# Patient Record
Sex: FEMALE | Race: WHITE | Employment: OTHER | URBAN - METROPOLITAN AREA
[De-identification: names, ages, dates, MRNs, and addresses within clinical notes are randomized per-mention and may not be internally consistent; named-entity substitution may affect disease eponyms.]

---

## 2020-07-29 ENCOUNTER — HOSPITAL ENCOUNTER (INPATIENT)
Age: 55
LOS: 4 days | Discharge: HOME | DRG: 854 | End: 2020-08-02
Attending: EMERGENCY MEDICINE | Admitting: INTERNAL MEDICINE
Payer: COMMERCIAL

## 2020-07-29 ENCOUNTER — APPOINTMENT (OUTPATIENT)
Dept: CT IMAGING | Age: 55
DRG: 854 | End: 2020-07-29
Payer: COMMERCIAL

## 2020-07-29 PROBLEM — N20.0 NEPHROLITHIASIS: Status: ACTIVE | Noted: 2020-07-29

## 2020-07-29 LAB
A/G RATIO: 1.2 (ref 1.1–2.2)
ALBUMIN SERPL-MCNC: 4.3 G/DL (ref 3.4–5)
ALP BLD-CCNC: 91 U/L (ref 40–129)
ALT SERPL-CCNC: 27 U/L (ref 10–40)
AMORPHOUS: ABNORMAL /HPF
ANION GAP SERPL CALCULATED.3IONS-SCNC: 12 MMOL/L (ref 3–16)
AST SERPL-CCNC: 25 U/L (ref 15–37)
BACTERIA: ABNORMAL /HPF
BASOPHILS ABSOLUTE: 0.1 K/UL (ref 0–0.2)
BASOPHILS RELATIVE PERCENT: 0.6 %
BILIRUB SERPL-MCNC: 0.4 MG/DL (ref 0–1)
BILIRUBIN URINE: ABNORMAL
BLOOD, URINE: ABNORMAL
BUN BLDV-MCNC: 21 MG/DL (ref 7–20)
CALCIUM SERPL-MCNC: 9.4 MG/DL (ref 8.3–10.6)
CHLORIDE BLD-SCNC: 100 MMOL/L (ref 99–110)
CLARITY: ABNORMAL
CO2: 30 MMOL/L (ref 21–32)
COLOR: YELLOW
CREAT SERPL-MCNC: 1 MG/DL (ref 0.6–1.1)
EOSINOPHILS ABSOLUTE: 0.1 K/UL (ref 0–0.6)
EOSINOPHILS RELATIVE PERCENT: 0.8 %
EPITHELIAL CELLS, UA: ABNORMAL /HPF (ref 0–5)
GFR AFRICAN AMERICAN: >60
GFR NON-AFRICAN AMERICAN: 57
GLOBULIN: 3.6 G/DL
GLUCOSE BLD-MCNC: 132 MG/DL (ref 70–99)
GLUCOSE URINE: NEGATIVE MG/DL
HCG QUALITATIVE: NEGATIVE
HCT VFR BLD CALC: 46 % (ref 36–48)
HEMOGLOBIN: 15 G/DL (ref 12–16)
KETONES, URINE: ABNORMAL MG/DL
LACTIC ACID: 3.6 MMOL/L (ref 0.4–2)
LEUKOCYTE ESTERASE, URINE: ABNORMAL
LIPASE: 28 U/L (ref 13–60)
LYMPHOCYTES ABSOLUTE: 1.2 K/UL (ref 1–5.1)
LYMPHOCYTES RELATIVE PERCENT: 9.3 %
MCH RBC QN AUTO: 28.8 PG (ref 26–34)
MCHC RBC AUTO-ENTMCNC: 32.6 G/DL (ref 31–36)
MCV RBC AUTO: 88.2 FL (ref 80–100)
MICROSCOPIC EXAMINATION: YES
MONOCYTES ABSOLUTE: 0.6 K/UL (ref 0–1.3)
MONOCYTES RELATIVE PERCENT: 4.9 %
NEUTROPHILS ABSOLUTE: 10.6 K/UL (ref 1.7–7.7)
NEUTROPHILS RELATIVE PERCENT: 84.4 %
NITRITE, URINE: NEGATIVE
PDW BLD-RTO: 14.5 % (ref 12.4–15.4)
PH UA: 5.5 (ref 5–8)
PLATELET # BLD: 179 K/UL (ref 135–450)
PMV BLD AUTO: 12 FL (ref 5–10.5)
POTASSIUM SERPL-SCNC: 3.9 MMOL/L (ref 3.5–5.1)
PROTEIN UA: 100 MG/DL
RBC # BLD: 5.22 M/UL (ref 4–5.2)
RBC UA: ABNORMAL /HPF (ref 0–4)
SODIUM BLD-SCNC: 142 MMOL/L (ref 136–145)
SPECIFIC GRAVITY UA: >=1.03 (ref 1–1.03)
SPECIMEN STATUS: NORMAL
TOTAL PROTEIN: 7.9 G/DL (ref 6.4–8.2)
URINE TYPE: ABNORMAL
UROBILINOGEN, URINE: 0.2 E.U./DL
WBC # BLD: 12.6 K/UL (ref 4–11)
WBC UA: ABNORMAL /HPF (ref 0–5)

## 2020-07-29 PROCEDURE — 96374 THER/PROPH/DIAG INJ IV PUSH: CPT

## 2020-07-29 PROCEDURE — 85025 COMPLETE CBC W/AUTO DIFF WBC: CPT

## 2020-07-29 PROCEDURE — 6360000002 HC RX W HCPCS: Performed by: EMERGENCY MEDICINE

## 2020-07-29 PROCEDURE — 2580000003 HC RX 258: Performed by: EMERGENCY MEDICINE

## 2020-07-29 PROCEDURE — 99285 EMERGENCY DEPT VISIT HI MDM: CPT

## 2020-07-29 PROCEDURE — 6370000000 HC RX 637 (ALT 250 FOR IP): Performed by: EMERGENCY MEDICINE

## 2020-07-29 PROCEDURE — 36415 COLL VENOUS BLD VENIPUNCTURE: CPT

## 2020-07-29 PROCEDURE — 87150 DNA/RNA AMPLIFIED PROBE: CPT

## 2020-07-29 PROCEDURE — 1200000000 HC SEMI PRIVATE

## 2020-07-29 PROCEDURE — 74177 CT ABD & PELVIS W/CONTRAST: CPT

## 2020-07-29 PROCEDURE — 87086 URINE CULTURE/COLONY COUNT: CPT

## 2020-07-29 PROCEDURE — 81001 URINALYSIS AUTO W/SCOPE: CPT

## 2020-07-29 PROCEDURE — 2580000003 HC RX 258

## 2020-07-29 PROCEDURE — 2580000003 HC RX 258: Performed by: NURSE PRACTITIONER

## 2020-07-29 PROCEDURE — 83605 ASSAY OF LACTIC ACID: CPT

## 2020-07-29 PROCEDURE — 84703 CHORIONIC GONADOTROPIN ASSAY: CPT

## 2020-07-29 PROCEDURE — 87077 CULTURE AEROBIC IDENTIFY: CPT

## 2020-07-29 PROCEDURE — 87040 BLOOD CULTURE FOR BACTERIA: CPT

## 2020-07-29 PROCEDURE — 80053 COMPREHEN METABOLIC PANEL: CPT

## 2020-07-29 PROCEDURE — 6360000004 HC RX CONTRAST MEDICATION: Performed by: EMERGENCY MEDICINE

## 2020-07-29 PROCEDURE — 96375 TX/PRO/DX INJ NEW DRUG ADDON: CPT

## 2020-07-29 PROCEDURE — 87186 SC STD MICRODIL/AGAR DIL: CPT

## 2020-07-29 PROCEDURE — 83690 ASSAY OF LIPASE: CPT

## 2020-07-29 RX ORDER — ALBUTEROL SULFATE 90 UG/1
2 AEROSOL, METERED RESPIRATORY (INHALATION) EVERY 4 HOURS PRN
COMMUNITY
Start: 2020-06-18

## 2020-07-29 RX ORDER — SODIUM CHLORIDE 9 MG/ML
INJECTION, SOLUTION INTRAVENOUS CONTINUOUS
Status: DISCONTINUED | OUTPATIENT
Start: 2020-07-29 | End: 2020-08-02 | Stop reason: HOSPADM

## 2020-07-29 RX ORDER — LEVOTHYROXINE SODIUM 0.15 MG/1
150 TABLET ORAL DAILY
Status: DISCONTINUED | OUTPATIENT
Start: 2020-07-30 | End: 2020-08-02 | Stop reason: HOSPADM

## 2020-07-29 RX ORDER — MORPHINE SULFATE 4 MG/ML
4 INJECTION, SOLUTION INTRAMUSCULAR; INTRAVENOUS
Status: DISCONTINUED | OUTPATIENT
Start: 2020-07-29 | End: 2020-07-30

## 2020-07-29 RX ORDER — SODIUM CHLORIDE 0.9 % (FLUSH) 0.9 %
10 SYRINGE (ML) INJECTION PRN
Status: DISCONTINUED | OUTPATIENT
Start: 2020-07-29 | End: 2020-08-02 | Stop reason: HOSPADM

## 2020-07-29 RX ORDER — LISINOPRIL AND HYDROCHLOROTHIAZIDE 12.5; 1 MG/1; MG/1
1 TABLET ORAL DAILY
Status: DISCONTINUED | OUTPATIENT
Start: 2020-07-30 | End: 2020-07-29 | Stop reason: CLARIF

## 2020-07-29 RX ORDER — ONDANSETRON 2 MG/ML
4 INJECTION INTRAMUSCULAR; INTRAVENOUS
Status: DISCONTINUED | OUTPATIENT
Start: 2020-07-29 | End: 2020-07-30

## 2020-07-29 RX ORDER — LEVOTHYROXINE SODIUM 0.15 MG/1
TABLET ORAL
COMMUNITY
Start: 2020-06-18

## 2020-07-29 RX ORDER — SODIUM CHLORIDE, SODIUM LACTATE, POTASSIUM CHLORIDE, AND CALCIUM CHLORIDE .6; .31; .03; .02 G/100ML; G/100ML; G/100ML; G/100ML
1000 INJECTION, SOLUTION INTRAVENOUS ONCE
Status: COMPLETED | OUTPATIENT
Start: 2020-07-29 | End: 2020-07-29

## 2020-07-29 RX ORDER — ONDANSETRON 2 MG/ML
4 INJECTION INTRAMUSCULAR; INTRAVENOUS EVERY 6 HOURS PRN
Status: DISCONTINUED | OUTPATIENT
Start: 2020-07-29 | End: 2020-08-02 | Stop reason: HOSPADM

## 2020-07-29 RX ORDER — ACETAMINOPHEN 500 MG
1000 TABLET ORAL ONCE
Status: COMPLETED | OUTPATIENT
Start: 2020-07-29 | End: 2020-07-29

## 2020-07-29 RX ORDER — SODIUM CHLORIDE, SODIUM LACTATE, POTASSIUM CHLORIDE, AND CALCIUM CHLORIDE .6; .31; .03; .02 G/100ML; G/100ML; G/100ML; G/100ML
1000 INJECTION, SOLUTION INTRAVENOUS ONCE
Status: COMPLETED | OUTPATIENT
Start: 2020-07-29 | End: 2020-07-30

## 2020-07-29 RX ORDER — SODIUM CHLORIDE 0.9 % (FLUSH) 0.9 %
10 SYRINGE (ML) INJECTION EVERY 12 HOURS SCHEDULED
Status: DISCONTINUED | OUTPATIENT
Start: 2020-07-29 | End: 2020-08-02 | Stop reason: HOSPADM

## 2020-07-29 RX ORDER — ACETAMINOPHEN 325 MG/1
650 TABLET ORAL EVERY 6 HOURS PRN
Status: DISCONTINUED | OUTPATIENT
Start: 2020-07-29 | End: 2020-08-02 | Stop reason: HOSPADM

## 2020-07-29 RX ORDER — HYDROCHLOROTHIAZIDE 25 MG/1
12.5 TABLET ORAL DAILY
Status: DISCONTINUED | OUTPATIENT
Start: 2020-07-30 | End: 2020-07-30

## 2020-07-29 RX ORDER — POLYETHYLENE GLYCOL 3350 17 G/17G
17 POWDER, FOR SOLUTION ORAL DAILY PRN
Status: DISCONTINUED | OUTPATIENT
Start: 2020-07-29 | End: 2020-08-02 | Stop reason: HOSPADM

## 2020-07-29 RX ORDER — ACETAMINOPHEN 650 MG/1
650 SUPPOSITORY RECTAL EVERY 6 HOURS PRN
Status: DISCONTINUED | OUTPATIENT
Start: 2020-07-29 | End: 2020-08-02 | Stop reason: HOSPADM

## 2020-07-29 RX ORDER — PROMETHAZINE HYDROCHLORIDE 25 MG/1
12.5 TABLET ORAL EVERY 6 HOURS PRN
Status: DISCONTINUED | OUTPATIENT
Start: 2020-07-29 | End: 2020-08-02 | Stop reason: HOSPADM

## 2020-07-29 RX ORDER — KETOROLAC TROMETHAMINE 30 MG/ML
30 INJECTION, SOLUTION INTRAMUSCULAR; INTRAVENOUS EVERY 6 HOURS PRN
Status: DISPENSED | OUTPATIENT
Start: 2020-07-29 | End: 2020-07-31

## 2020-07-29 RX ORDER — LISINOPRIL 10 MG/1
10 TABLET ORAL DAILY
Status: DISCONTINUED | OUTPATIENT
Start: 2020-07-30 | End: 2020-07-30

## 2020-07-29 RX ADMIN — ONDANSETRON 4 MG: 2 INJECTION INTRAMUSCULAR; INTRAVENOUS at 20:01

## 2020-07-29 RX ADMIN — SODIUM CHLORIDE: 9 INJECTION, SOLUTION INTRAVENOUS at 22:49

## 2020-07-29 RX ADMIN — DEXTROSE MONOHYDRATE 50 ML: 5 INJECTION INTRAVENOUS at 22:49

## 2020-07-29 RX ADMIN — ACETAMINOPHEN 1000 MG: 500 TABLET ORAL at 22:07

## 2020-07-29 RX ADMIN — CEFEPIME HYDROCHLORIDE 2 G: 2 INJECTION, POWDER, FOR SOLUTION INTRAVENOUS at 22:50

## 2020-07-29 RX ADMIN — Medication 10 ML: at 22:55

## 2020-07-29 RX ADMIN — MORPHINE SULFATE 4 MG: 4 INJECTION, SOLUTION INTRAMUSCULAR; INTRAVENOUS at 20:01

## 2020-07-29 RX ADMIN — SODIUM CHLORIDE, POTASSIUM CHLORIDE, SODIUM LACTATE AND CALCIUM CHLORIDE 1000 ML: 600; 310; 30; 20 INJECTION, SOLUTION INTRAVENOUS at 20:01

## 2020-07-29 RX ADMIN — SODIUM CHLORIDE, POTASSIUM CHLORIDE, SODIUM LACTATE AND CALCIUM CHLORIDE 1000 ML: 600; 310; 30; 20 INJECTION, SOLUTION INTRAVENOUS at 22:48

## 2020-07-29 RX ADMIN — IOPAMIDOL 75 ML: 755 INJECTION, SOLUTION INTRAVENOUS at 19:56

## 2020-07-29 ASSESSMENT — PAIN DESCRIPTION - DIRECTION: RADIATING_TOWARDS: RIGHT FLANK

## 2020-07-29 ASSESSMENT — PAIN SCALES - GENERAL
PAINLEVEL_OUTOF10: 0
PAINLEVEL_OUTOF10: 4
PAINLEVEL_OUTOF10: 3

## 2020-07-29 ASSESSMENT — PAIN DESCRIPTION - PAIN TYPE
TYPE: ACUTE PAIN
TYPE: ACUTE PAIN

## 2020-07-29 ASSESSMENT — PAIN DESCRIPTION - LOCATION
LOCATION: FLANK
LOCATION: ABDOMEN

## 2020-07-29 ASSESSMENT — PAIN DESCRIPTION - ORIENTATION
ORIENTATION: RIGHT
ORIENTATION: RIGHT;LOWER

## 2020-07-29 NOTE — ED PROVIDER NOTES
Take 0.5 mcg by mouth daily. Allergies   Allergen Reactions    Penicillins        REVIEW OF SYSTEMS  10 systems reviewed, pertinent positives per HPI otherwise noted to be negative. PHYSICAL EXAM  BP (!) 181/95   Pulse 105   Temp 99.5 °F (37.5 °C) (Oral)   Resp 18   Ht 5' 6\" (1.676 m)   Wt (!) 325 lb (147.4 kg)   LMP 11/24/2016   SpO2 95%   BMI 52.46 kg/m²    GENERAL APPEARANCE: Awake and alert. Cooperative. No distress. HENT: Normocephalic. Atraumatic. Mucous membranes are dry. NECK: Supple. EYES: PERRL. EOM's grossly intact. HEART/CHEST: RRR. No murmurs. No chest wall tenderness. LUNGS: Respirations unlabored. CTAB. Good air exchange. Speaking comfortably in full sentences. ABDOMEN:  R CVAT, no L CVAT. Moderate R flank and RLQ ttp without any RUQ or L sided tenderness. Soft. Non-distended. No masses. No organomegaly. No guarding or rebound. Normal bowel sounds throughout. MUSCULOSKELETAL: No extremity edema. Compartments soft. No deformity. No tenderness in the extremities. All extremities neurovascularly intact. SKIN: Warm and dry. No acute rashes. NEUROLOGICAL: Alert and oriented. CN's 2-12 intact. No gross facial drooping. Strength 5/5, sensation intact. 2 plus DTR's in knees bilaterally. Gait normal.  PSYCHIATRIC: Normal mood and affect. LABS  I have reviewed all labs for this visit.    Results for orders placed or performed during the hospital encounter of 07/29/20   CBC Auto Differential   Result Value Ref Range    WBC 12.6 (H) 4.0 - 11.0 K/uL    RBC 5.22 (H) 4.00 - 5.20 M/uL    Hemoglobin 15.0 12.0 - 16.0 g/dL    Hematocrit 46.0 36.0 - 48.0 %    MCV 88.2 80.0 - 100.0 fL    MCH 28.8 26.0 - 34.0 pg    MCHC 32.6 31.0 - 36.0 g/dL    RDW 14.5 12.4 - 15.4 %    Platelets 351 107 - 521 K/uL    MPV 12.0 (H) 5.0 - 10.5 fL    Neutrophils % 84.4 %    Lymphocytes % 9.3 %    Monocytes % 4.9 %    Eosinophils % 0.8 %    Basophils % 0.6 %    Neutrophils Absolute 10.6 (H) 1.7 - 7.7 K/uL Lymphocytes Absolute 1.2 1.0 - 5.1 K/uL    Monocytes Absolute 0.6 0.0 - 1.3 K/uL    Eosinophils Absolute 0.1 0.0 - 0.6 K/uL    Basophils Absolute 0.1 0.0 - 0.2 K/uL   Comprehensive Metabolic Panel   Result Value Ref Range    Sodium 142 136 - 145 mmol/L    Potassium 3.9 3.5 - 5.1 mmol/L    Chloride 100 99 - 110 mmol/L    CO2 30 21 - 32 mmol/L    Anion Gap 12 3 - 16    Glucose 132 (H) 70 - 99 mg/dL    BUN 21 (H) 7 - 20 mg/dL    CREATININE 1.0 0.6 - 1.1 mg/dL    GFR Non- 57 (A) >60    GFR African American >60 >60    Calcium 9.4 8.3 - 10.6 mg/dL    Total Protein 7.9 6.4 - 8.2 g/dL    Alb 4.3 3.4 - 5.0 g/dL    Albumin/Globulin Ratio 1.2 1.1 - 2.2    Total Bilirubin 0.4 0.0 - 1.0 mg/dL    Alkaline Phosphatase 91 40 - 129 U/L    ALT 27 10 - 40 U/L    AST 25 15 - 37 U/L    Globulin 3.6 g/dL   Urinalysis   Result Value Ref Range    Color, UA Yellow Straw/Yellow    Clarity, UA CLOUDY (A) Clear    Glucose, Ur Negative Negative mg/dL    Bilirubin Urine SMALL (A) Negative    Ketones, Urine TRACE (A) Negative mg/dL    Specific Gravity, UA >=1.030 1.005 - 1.030    Blood, Urine LARGE (A) Negative    pH, UA 5.5 5.0 - 8.0    Protein,  (A) Negative mg/dL    Urobilinogen, Urine 0.2 <2.0 E.U./dL    Nitrite, Urine Negative Negative    Leukocyte Esterase, Urine SMALL (A) Negative    Microscopic Examination YES     Urine Type NotGiven    Sample possible blood bank testing   Result Value Ref Range    Specimen Status JESUS    HCG Qualitative, Serum   Result Value Ref Range    hCG Qual Negative Detects HCG level >10 MIU/mL   Microscopic Urinalysis   Result Value Ref Range    WBC, UA  (A) 0 - 5 /HPF    RBC, UA 11-20 (A) 0 - 4 /HPF    Epithelial Cells, UA 11-20 (A) 0 - 5 /HPF    Bacteria, UA 2+ (A) None Seen /HPF    Amorphous, UA 4+ /HPF   Lipase   Result Value Ref Range    Lipase 28.0 13.0 - 60.0 U/L   Lactic Acid, Plasma   Result Value Ref Range    Lactic Acid 3.6 (H) 0.4 - 2.0 mmol/L       RADIOLOGY    Ct Abdomen Pelvis W Iv Contrast Additional Contrast? None    Result Date: 7/29/2020  EXAMINATION: CT OF THE ABDOMEN AND PELVIS WITH CONTRAST 7/29/2020 7:51 pm TECHNIQUE: CT of the abdomen and pelvis was performed with the administration of intravenous contrast. Multiplanar reformatted images are provided for review. Dose modulation, iterative reconstruction, and/or weight based adjustment of the mA/kV was utilized to reduce the radiation dose to as low as reasonably achievable. COMPARISON: 02/08/2008 HISTORY: ORDERING SYSTEM PROVIDED HISTORY: R sided pain, infected ua, appy vs pyelo vs stone vs GB? TECHNOLOGIST PROVIDED HISTORY: If patient is on cardiac monitor and/or pulse ox, they may be taken off cardiac monitor and pulse ox, left on O2 if currently on. All monitors reattached when patient returns to room. Additional Contrast?->None Reason for exam:->R sided pain, infected ua, appy vs pyelo vs stone vs GB? Reason for Exam: RT flank pain x 2 days Acuity: Acute Type of Exam: Initial Additional signs and symptoms: vomiting today,hematuria FINDINGS: Lower Chest: Left lower lobe scarring. Organs: Again identified is a hypodense 3.6 x 3.1 cm left hepatic lobe lesion with peripheral nodular enhancement most compatible with benign hemangioma. The spleen, pancreas, gallbladder, adrenal glands, and left kidneys show no acute abnormality. There is right perinephric and periureteral stranding as well as mild right hydronephrosis with a distal right ureteral obstructing 4 mm. There is also delayed excretion contrast of the right kidney. There is a distal right ureteral 4 mm stone. No other calcified urinary collecting system stones suspected. There is a benign 4.2 cm right renal cortical cyst again identified. GI/Bowel: Small hiatal hernia suspected. The bowel otherwise shows normal caliber and course without suspected wall thickening. Normal appendix.  There are scattered colonic diverticula, most numerous of the sigmoid colon, without evidence of active inflammation. Pelvis: A lobulated contour of the uterus is suggestive of fibroids. The urinary bladder is mostly collapsed. Peritoneum/Retroperitoneum: The aorta is normal in caliber and course, showing calcifications. Bones/Soft Tissues: There is mild 4 mm L4-5 degenerative anterolisthesis. No acute bony abnormality. No free fluid or adenopathy. Right hydronephrosis and right hydroureter with obstructing 4 mm distal right ureteral stone. ED COURSE/MDM  Patient seen and evaluated. Old records reviewed. Labs and imaging reviewed and results discussed with patient. After initial evaluation, differential diagnostic considerations included: kidney stone, pyelonephritis, UTI, appendicitis, bowel obstruction, diverticulitis, hernia, gastritis/gastroenteritis, pancreatitis, cholecystitis, hepatitis, constipation, IBS, IBD    The patient's ED workup was notable for 4 mm kidney stone on the right side with hydroureter and hydronephrosis. The patient's urinalysis also appears more consistent with infection and therefore urine culture was obtained and the patient will be started on cefepime. Lactate is 3.6 suggesting severe sepsis with a stable blood pressure. Aggressive IV fluid resuscitation was initiated as well as Tylenol for fever. +WBC elevation but no MARISA. Dr. Char Ray from urology was consulted about the patient's ED history, physical, workup, and course so far. Recommendations from this consultant included NPO after MN, may need stent. SEP-1 CORE MEASURE DATA    Classification: severe sepsis    Amount of fluids ordered: at least 30mL/kg based on ideal body weight due to obesity defined as BMI >30 (patient's BMI is Body mass index is 52.46 kg/m².  and IBW is Ideal body weight: 59.3 kg (130 lb 11.7 oz)Adjusted ideal body weight: 94.5 kg (208 lb 7 oz))    Time at which sepsis was identified: 2109    Broad-spectrum antibiotics chosen: cefepime based on suspected present due to limitations of this technology and occasionally words are not transcribed correctly.         Tip Trotter MD  07/29/20 7708

## 2020-07-30 ENCOUNTER — ANESTHESIA EVENT (OUTPATIENT)
Dept: OPERATING ROOM | Age: 55
DRG: 854 | End: 2020-07-30
Payer: COMMERCIAL

## 2020-07-30 ENCOUNTER — ANESTHESIA (OUTPATIENT)
Dept: OPERATING ROOM | Age: 55
DRG: 854 | End: 2020-07-30
Payer: COMMERCIAL

## 2020-07-30 ENCOUNTER — APPOINTMENT (OUTPATIENT)
Dept: GENERAL RADIOLOGY | Age: 55
DRG: 854 | End: 2020-07-30
Payer: COMMERCIAL

## 2020-07-30 VITALS
SYSTOLIC BLOOD PRESSURE: 103 MMHG | OXYGEN SATURATION: 86 % | RESPIRATION RATE: 2 BRPM | DIASTOLIC BLOOD PRESSURE: 60 MMHG

## 2020-07-30 LAB
ANION GAP SERPL CALCULATED.3IONS-SCNC: 10 MMOL/L (ref 3–16)
ANISOCYTOSIS: ABNORMAL
BANDED NEUTROPHILS RELATIVE PERCENT: 63 % (ref 0–7)
BASOPHILS ABSOLUTE: 0 K/UL (ref 0–0.2)
BASOPHILS ABSOLUTE: 0.3 K/UL (ref 0–0.2)
BASOPHILS RELATIVE PERCENT: 0 %
BASOPHILS RELATIVE PERCENT: 0.8 %
BUN BLDV-MCNC: 25 MG/DL (ref 7–20)
CALCIUM SERPL-MCNC: 9 MG/DL (ref 8.3–10.6)
CHLORIDE BLD-SCNC: 101 MMOL/L (ref 99–110)
CO2: 28 MMOL/L (ref 21–32)
CREAT SERPL-MCNC: 1.8 MG/DL (ref 0.6–1.1)
EOSINOPHILS ABSOLUTE: 0 K/UL (ref 0–0.6)
EOSINOPHILS ABSOLUTE: 0 K/UL (ref 0–0.6)
EOSINOPHILS RELATIVE PERCENT: 0 %
EOSINOPHILS RELATIVE PERCENT: 0 %
GFR AFRICAN AMERICAN: 35
GFR NON-AFRICAN AMERICAN: 29
GLUCOSE BLD-MCNC: 111 MG/DL (ref 70–99)
HCT VFR BLD CALC: 39.3 % (ref 36–48)
HCT VFR BLD CALC: 40.9 % (ref 36–48)
HEMOGLOBIN: 12.8 G/DL (ref 12–16)
HEMOGLOBIN: 13.2 G/DL (ref 12–16)
LACTIC ACID: 2.5 MMOL/L (ref 0.4–2)
LACTIC ACID: 2.6 MMOL/L (ref 0.4–2)
LYMPHOCYTES ABSOLUTE: 0.4 K/UL (ref 1–5.1)
LYMPHOCYTES ABSOLUTE: 0.4 K/UL (ref 1–5.1)
LYMPHOCYTES RELATIVE PERCENT: 1 %
LYMPHOCYTES RELATIVE PERCENT: 1 %
MCH RBC QN AUTO: 28.5 PG (ref 26–34)
MCH RBC QN AUTO: 28.7 PG (ref 26–34)
MCHC RBC AUTO-ENTMCNC: 32.3 G/DL (ref 31–36)
MCHC RBC AUTO-ENTMCNC: 32.5 G/DL (ref 31–36)
MCV RBC AUTO: 88.3 FL (ref 80–100)
MCV RBC AUTO: 88.4 FL (ref 80–100)
MONOCYTES ABSOLUTE: 0.7 K/UL (ref 0–1.3)
MONOCYTES ABSOLUTE: 0.9 K/UL (ref 0–1.3)
MONOCYTES RELATIVE PERCENT: 2 %
MONOCYTES RELATIVE PERCENT: 2.2 %
NEUTROPHILS ABSOLUTE: 32.1 K/UL (ref 1.7–7.7)
NEUTROPHILS ABSOLUTE: 43.1 K/UL (ref 1.7–7.7)
NEUTROPHILS RELATIVE PERCENT: 34 %
NEUTROPHILS RELATIVE PERCENT: 96 %
PDW BLD-RTO: 14.8 % (ref 12.4–15.4)
PDW BLD-RTO: 14.9 % (ref 12.4–15.4)
PLATELET # BLD: 144 K/UL (ref 135–450)
PLATELET # BLD: 150 K/UL (ref 135–450)
PLATELET SLIDE REVIEW: ADEQUATE
PMV BLD AUTO: 11.8 FL (ref 5–10.5)
PMV BLD AUTO: 12.7 FL (ref 5–10.5)
POIKILOCYTES: ABNORMAL
POLYCHROMASIA: ABNORMAL
POTASSIUM REFLEX MAGNESIUM: 3.6 MMOL/L (ref 3.5–5.1)
RBC # BLD: 4.44 M/UL (ref 4–5.2)
RBC # BLD: 4.63 M/UL (ref 4–5.2)
REPORT: NORMAL
SARS-COV-2, NAAT: NOT DETECTED
SLIDE REVIEW: ABNORMAL
SODIUM BLD-SCNC: 139 MMOL/L (ref 136–145)
WBC # BLD: 33.5 K/UL (ref 4–11)
WBC # BLD: 44.4 K/UL (ref 4–11)

## 2020-07-30 PROCEDURE — 7100000001 HC PACU RECOVERY - ADDTL 15 MIN: Performed by: UROLOGY

## 2020-07-30 PROCEDURE — 6370000000 HC RX 637 (ALT 250 FOR IP): Performed by: NURSE PRACTITIONER

## 2020-07-30 PROCEDURE — 3600000014 HC SURGERY LEVEL 4 ADDTL 15MIN: Performed by: UROLOGY

## 2020-07-30 PROCEDURE — 2700000000 HC OXYGEN THERAPY PER DAY

## 2020-07-30 PROCEDURE — 85025 COMPLETE CBC W/AUTO DIFF WBC: CPT

## 2020-07-30 PROCEDURE — 2500000003 HC RX 250 WO HCPCS: Performed by: NURSE ANESTHETIST, CERTIFIED REGISTERED

## 2020-07-30 PROCEDURE — 3700000000 HC ANESTHESIA ATTENDED CARE: Performed by: UROLOGY

## 2020-07-30 PROCEDURE — U0002 COVID-19 LAB TEST NON-CDC: HCPCS

## 2020-07-30 PROCEDURE — 3600000004 HC SURGERY LEVEL 4 BASE: Performed by: UROLOGY

## 2020-07-30 PROCEDURE — 6360000002 HC RX W HCPCS: Performed by: NURSE PRACTITIONER

## 2020-07-30 PROCEDURE — 6360000002 HC RX W HCPCS: Performed by: NURSE ANESTHETIST, CERTIFIED REGISTERED

## 2020-07-30 PROCEDURE — 36415 COLL VENOUS BLD VENIPUNCTURE: CPT

## 2020-07-30 PROCEDURE — 7100000000 HC PACU RECOVERY - FIRST 15 MIN: Performed by: UROLOGY

## 2020-07-30 PROCEDURE — 80048 BASIC METABOLIC PNL TOTAL CA: CPT

## 2020-07-30 PROCEDURE — 0T768DZ DILATION OF RIGHT URETER WITH INTRALUMINAL DEVICE, VIA NATURAL OR ARTIFICIAL OPENING ENDOSCOPIC: ICD-10-PCS | Performed by: UROLOGY

## 2020-07-30 PROCEDURE — 3700000001 HC ADD 15 MINUTES (ANESTHESIA): Performed by: UROLOGY

## 2020-07-30 PROCEDURE — 2580000003 HC RX 258: Performed by: NURSE PRACTITIONER

## 2020-07-30 PROCEDURE — 2580000003 HC RX 258: Performed by: INTERNAL MEDICINE

## 2020-07-30 PROCEDURE — 2580000003 HC RX 258: Performed by: UROLOGY

## 2020-07-30 PROCEDURE — 83605 ASSAY OF LACTIC ACID: CPT

## 2020-07-30 PROCEDURE — 3209999900 FLUORO FOR SURGICAL PROCEDURES

## 2020-07-30 PROCEDURE — 6360000002 HC RX W HCPCS: Performed by: INTERNAL MEDICINE

## 2020-07-30 PROCEDURE — 2709999900 HC NON-CHARGEABLE SUPPLY: Performed by: UROLOGY

## 2020-07-30 PROCEDURE — 74018 RADEX ABDOMEN 1 VIEW: CPT

## 2020-07-30 PROCEDURE — C2617 STENT, NON-COR, TEM W/O DEL: HCPCS | Performed by: UROLOGY

## 2020-07-30 PROCEDURE — 1200000000 HC SEMI PRIVATE

## 2020-07-30 PROCEDURE — 94761 N-INVAS EAR/PLS OXIMETRY MLT: CPT

## 2020-07-30 DEVICE — URETERAL STENT
Type: IMPLANTABLE DEVICE | Status: FUNCTIONAL
Brand: CONTOUR™

## 2020-07-30 RX ORDER — MAGNESIUM HYDROXIDE 1200 MG/15ML
LIQUID ORAL CONTINUOUS PRN
Status: COMPLETED | OUTPATIENT
Start: 2020-07-30 | End: 2020-07-30

## 2020-07-30 RX ORDER — MORPHINE SULFATE 2 MG/ML
1 INJECTION, SOLUTION INTRAMUSCULAR; INTRAVENOUS EVERY 5 MIN PRN
Status: DISCONTINUED | OUTPATIENT
Start: 2020-07-30 | End: 2020-07-30 | Stop reason: HOSPADM

## 2020-07-30 RX ORDER — ROCURONIUM BROMIDE 10 MG/ML
INJECTION, SOLUTION INTRAVENOUS PRN
Status: DISCONTINUED | OUTPATIENT
Start: 2020-07-30 | End: 2020-07-30 | Stop reason: SDUPTHER

## 2020-07-30 RX ORDER — PROPOFOL 10 MG/ML
INJECTION, EMULSION INTRAVENOUS PRN
Status: DISCONTINUED | OUTPATIENT
Start: 2020-07-30 | End: 2020-07-30 | Stop reason: SDUPTHER

## 2020-07-30 RX ORDER — MEPERIDINE HYDROCHLORIDE 50 MG/ML
12.5 INJECTION INTRAMUSCULAR; INTRAVENOUS; SUBCUTANEOUS EVERY 5 MIN PRN
Status: DISCONTINUED | OUTPATIENT
Start: 2020-07-30 | End: 2020-07-30 | Stop reason: HOSPADM

## 2020-07-30 RX ORDER — OXYCODONE HYDROCHLORIDE AND ACETAMINOPHEN 5; 325 MG/1; MG/1
2 TABLET ORAL PRN
Status: DISCONTINUED | OUTPATIENT
Start: 2020-07-30 | End: 2020-07-30 | Stop reason: HOSPADM

## 2020-07-30 RX ORDER — HYDRALAZINE HYDROCHLORIDE 20 MG/ML
5 INJECTION INTRAMUSCULAR; INTRAVENOUS EVERY 10 MIN PRN
Status: DISCONTINUED | OUTPATIENT
Start: 2020-07-30 | End: 2020-07-30 | Stop reason: HOSPADM

## 2020-07-30 RX ORDER — PROMETHAZINE HYDROCHLORIDE 25 MG/ML
6.25 INJECTION, SOLUTION INTRAMUSCULAR; INTRAVENOUS
Status: DISCONTINUED | OUTPATIENT
Start: 2020-07-30 | End: 2020-07-30 | Stop reason: HOSPADM

## 2020-07-30 RX ORDER — ONDANSETRON 2 MG/ML
4 INJECTION INTRAMUSCULAR; INTRAVENOUS PRN
Status: DISCONTINUED | OUTPATIENT
Start: 2020-07-30 | End: 2020-07-30 | Stop reason: HOSPADM

## 2020-07-30 RX ORDER — SUCCINYLCHOLINE CHLORIDE 20 MG/ML
INJECTION INTRAMUSCULAR; INTRAVENOUS PRN
Status: DISCONTINUED | OUTPATIENT
Start: 2020-07-30 | End: 2020-07-30 | Stop reason: SDUPTHER

## 2020-07-30 RX ORDER — FENTANYL CITRATE 50 UG/ML
INJECTION, SOLUTION INTRAMUSCULAR; INTRAVENOUS PRN
Status: DISCONTINUED | OUTPATIENT
Start: 2020-07-30 | End: 2020-07-30 | Stop reason: SDUPTHER

## 2020-07-30 RX ORDER — DEXAMETHASONE SODIUM PHOSPHATE 4 MG/ML
INJECTION, SOLUTION INTRA-ARTICULAR; INTRALESIONAL; INTRAMUSCULAR; INTRAVENOUS; SOFT TISSUE PRN
Status: DISCONTINUED | OUTPATIENT
Start: 2020-07-30 | End: 2020-07-30 | Stop reason: SDUPTHER

## 2020-07-30 RX ORDER — MORPHINE SULFATE 2 MG/ML
2 INJECTION, SOLUTION INTRAMUSCULAR; INTRAVENOUS EVERY 5 MIN PRN
Status: DISCONTINUED | OUTPATIENT
Start: 2020-07-30 | End: 2020-07-30 | Stop reason: HOSPADM

## 2020-07-30 RX ORDER — LABETALOL HYDROCHLORIDE 5 MG/ML
5 INJECTION, SOLUTION INTRAVENOUS EVERY 10 MIN PRN
Status: DISCONTINUED | OUTPATIENT
Start: 2020-07-30 | End: 2020-07-30 | Stop reason: HOSPADM

## 2020-07-30 RX ORDER — ONDANSETRON 2 MG/ML
INJECTION INTRAMUSCULAR; INTRAVENOUS PRN
Status: DISCONTINUED | OUTPATIENT
Start: 2020-07-30 | End: 2020-07-30 | Stop reason: SDUPTHER

## 2020-07-30 RX ORDER — CALCITRIOL 0.25 UG/1
0.5 CAPSULE, LIQUID FILLED ORAL DAILY
Status: DISCONTINUED | OUTPATIENT
Start: 2020-07-30 | End: 2020-08-02 | Stop reason: HOSPADM

## 2020-07-30 RX ORDER — LIDOCAINE HYDROCHLORIDE 20 MG/ML
INJECTION, SOLUTION INFILTRATION; PERINEURAL PRN
Status: DISCONTINUED | OUTPATIENT
Start: 2020-07-30 | End: 2020-07-30 | Stop reason: SDUPTHER

## 2020-07-30 RX ORDER — DIPHENHYDRAMINE HYDROCHLORIDE 50 MG/ML
12.5 INJECTION INTRAMUSCULAR; INTRAVENOUS
Status: DISCONTINUED | OUTPATIENT
Start: 2020-07-30 | End: 2020-07-30 | Stop reason: HOSPADM

## 2020-07-30 RX ORDER — MIDAZOLAM HYDROCHLORIDE 1 MG/ML
INJECTION INTRAMUSCULAR; INTRAVENOUS PRN
Status: DISCONTINUED | OUTPATIENT
Start: 2020-07-30 | End: 2020-07-30 | Stop reason: SDUPTHER

## 2020-07-30 RX ORDER — OXYCODONE HYDROCHLORIDE AND ACETAMINOPHEN 5; 325 MG/1; MG/1
1 TABLET ORAL PRN
Status: DISCONTINUED | OUTPATIENT
Start: 2020-07-30 | End: 2020-07-30 | Stop reason: HOSPADM

## 2020-07-30 RX ORDER — PHENYLEPHRINE HCL IN 0.9% NACL 1 MG/10 ML
SYRINGE (ML) INTRAVENOUS PRN
Status: DISCONTINUED | OUTPATIENT
Start: 2020-07-30 | End: 2020-07-30 | Stop reason: SDUPTHER

## 2020-07-30 RX ADMIN — Medication 10 ML: at 10:56

## 2020-07-30 RX ADMIN — Medication 100 MCG: at 13:54

## 2020-07-30 RX ADMIN — MIDAZOLAM HYDROCHLORIDE 1 MG: 2 INJECTION, SOLUTION INTRAMUSCULAR; INTRAVENOUS at 13:37

## 2020-07-30 RX ADMIN — SODIUM CHLORIDE: 9 INJECTION, SOLUTION INTRAVENOUS at 21:22

## 2020-07-30 RX ADMIN — LEVOTHYROXINE SODIUM 150 MCG: 0.15 TABLET ORAL at 08:35

## 2020-07-30 RX ADMIN — SODIUM CHLORIDE: 9 INJECTION, SOLUTION INTRAVENOUS at 14:12

## 2020-07-30 RX ADMIN — ONDANSETRON 4 MG: 2 INJECTION INTRAMUSCULAR; INTRAVENOUS at 08:35

## 2020-07-30 RX ADMIN — LIDOCAINE HYDROCHLORIDE 100 MG: 20 INJECTION, SOLUTION INFILTRATION; PERINEURAL at 13:41

## 2020-07-30 RX ADMIN — CEFEPIME HYDROCHLORIDE 1 G: 1 INJECTION, POWDER, FOR SOLUTION INTRAMUSCULAR; INTRAVENOUS at 11:35

## 2020-07-30 RX ADMIN — SODIUM CHLORIDE: 9 INJECTION, SOLUTION INTRAVENOUS at 18:45

## 2020-07-30 RX ADMIN — FENTANYL CITRATE 50 MCG: 50 INJECTION INTRAMUSCULAR; INTRAVENOUS at 13:41

## 2020-07-30 RX ADMIN — SODIUM CHLORIDE: 9 INJECTION, SOLUTION INTRAVENOUS at 11:36

## 2020-07-30 RX ADMIN — KETOROLAC TROMETHAMINE 30 MG: 30 INJECTION, SOLUTION INTRAMUSCULAR at 11:43

## 2020-07-30 RX ADMIN — MEROPENEM 1 G: 1 INJECTION, POWDER, FOR SOLUTION INTRAVENOUS at 18:44

## 2020-07-30 RX ADMIN — SODIUM CHLORIDE: 9 INJECTION, SOLUTION INTRAVENOUS at 21:20

## 2020-07-30 RX ADMIN — Medication 10 ML: at 21:25

## 2020-07-30 RX ADMIN — SUGAMMADEX 400 MG: 100 INJECTION, SOLUTION INTRAVENOUS at 14:06

## 2020-07-30 RX ADMIN — ROCURONIUM BROMIDE 30 MG: 10 SOLUTION INTRAVENOUS at 13:56

## 2020-07-30 RX ADMIN — MIDAZOLAM HYDROCHLORIDE 1 MG: 2 INJECTION, SOLUTION INTRAMUSCULAR; INTRAVENOUS at 13:40

## 2020-07-30 RX ADMIN — SODIUM CHLORIDE: 9 INJECTION, SOLUTION INTRAVENOUS at 13:38

## 2020-07-30 RX ADMIN — Medication 100 MCG: at 14:04

## 2020-07-30 RX ADMIN — SUCCINYLCHOLINE CHLORIDE 180 MG: 20 INJECTION, SOLUTION INTRAMUSCULAR; INTRAVENOUS at 13:43

## 2020-07-30 RX ADMIN — DEXAMETHASONE SODIUM PHOSPHATE 8 MG: 4 INJECTION, SOLUTION INTRAMUSCULAR; INTRAVENOUS at 13:51

## 2020-07-30 RX ADMIN — PROPOFOL 200 MG: 10 INJECTION, EMULSION INTRAVENOUS at 13:43

## 2020-07-30 RX ADMIN — CALCITRIOL 0.5 MCG: 0.25 CAPSULE ORAL at 08:35

## 2020-07-30 RX ADMIN — ONDANSETRON 4 MG: 2 INJECTION INTRAMUSCULAR; INTRAVENOUS at 13:51

## 2020-07-30 ASSESSMENT — PULMONARY FUNCTION TESTS
PIF_VALUE: 0
PIF_VALUE: 15
PIF_VALUE: 36
PIF_VALUE: 2
PIF_VALUE: 35
PIF_VALUE: 38
PIF_VALUE: 32
PIF_VALUE: 31
PIF_VALUE: 35
PIF_VALUE: 0
PIF_VALUE: 35
PIF_VALUE: 35
PIF_VALUE: 0
PIF_VALUE: 35
PIF_VALUE: 0
PIF_VALUE: 2
PIF_VALUE: 35
PIF_VALUE: 35
PIF_VALUE: 0
PIF_VALUE: 34
PIF_VALUE: 0
PIF_VALUE: 2
PIF_VALUE: 1
PIF_VALUE: 35
PIF_VALUE: 3
PIF_VALUE: 30
PIF_VALUE: 30
PIF_VALUE: 3
PIF_VALUE: 35
PIF_VALUE: 35
PIF_VALUE: 1

## 2020-07-30 ASSESSMENT — PAIN SCALES - GENERAL
PAINLEVEL_OUTOF10: 8
PAINLEVEL_OUTOF10: 0

## 2020-07-30 NOTE — PROGRESS NOTES
Admitted to C[facility]. Admission completed and charted. Temp going down, will continue to monitor. Bolus given. Four eye completed. R flank pain/tenderness improving per pt. Bed locked and in lowest position. Call light within reach. Pt denies any other needs at this time. Will continue to monitor.

## 2020-07-30 NOTE — ED NOTES
[address] urology @ 2108   Re: infected stone  Dr. Carlos A Patel @ 2118     Nikki Varner  07/29/20 2118

## 2020-07-30 NOTE — H&P
Hospital Medicine History & Physical      PCP: Sandra Padilla    Date of Admission: 7/29/2020    Date of Service: Pt seen/examined on 7/29/2020 and Admitted to Inpatient with expected LOS greater than two midnights due to medical therapy. Chief Complaint:  Right flank pain    History Of Present Illness:      60 y.o. female, with PMH of HTN and morbid obesity, who presented to [facility] with right flank pain. History obtained from the patient and review of EMR. The patient stated 3 to 4 days ago she urinated and noticed her urine was a different color. She stated at that time she was not too concerned, she just thought she did not drink enough water. The patient stated 2 days ago she did have some blood in her urine and again did not seem concerned. However, the patient stated yesterday she began to have some right flank pain that was associated with nausea. She stated this morning she was woken up to the right flank pain that radiated around to her right lower quadrant. The patient stated her pain was associated with nausea and vomiting. She stated she did have 3 episodes of vomiting and therefore called her PCP. The patient stated her PCP referred her to the urgent care and when she arrived in urgent care they told her she needed to go to the emergency room for a CT scan. In the emergency room the patient had a CT of her abdomen and pelvis that revealed right hydronephrosis and right hydroureter with obstructing 4 MM distal right ureteral stone. She was given an antiemetic and IV pain medication. Urology was consulted and the patient was made n.p.o. She was started on cefepime. The patient will be admitted for further evaluation. She denied any other associated symptoms as well as any aggravating and/or alleviating factors. At the time of this assessment, the patient was resting comfortably in bed.  She currently denies any chest pain, back pain, abdominal pain, shortness of breath, Conjunctivae/corneas clear. Neck: Supple, with full range of motion. No jugular venous distention. Trachea midline. Respiratory:  Normal respiratory effort. Clear to auscultation, bilaterally without Rales/Wheezes/Rhonchi. Cardiovascular:  Regular rate and rhythm with normal S1/S2 without murmurs, rubs or gallops. Abdomen: Soft, obese, round, non-tender, non-distended with normal bowel sounds. Musculoskeletal:  No clubbing, cyanosis or edema bilaterally. Full range of motion without deformity. Skin: Skin color, texture, turgor normal.  No rashes or lesions. Neurologic:  Neurovascularly intact. Cranial nerves: II-XII intact, grossly non-focal.  Psychiatric:  Alert and oriented, thought content appropriate, normal insight  Capillary Refill: Brisk,< 3 seconds   Peripheral Pulses: +2 palpable, equal bilaterally     Labs:     Recent Labs     07/29/20  1647   WBC 12.6*   HGB 15.0   HCT 46.0        Recent Labs     07/29/20  1647      K 3.9      CO2 30   BUN 21*   CREATININE 1.0   CALCIUM 9.4     Recent Labs     07/29/20  1647   AST 25   ALT 27   BILITOT 0.4   ALKPHOS 91     Urinalysis:      Lab Results   Component Value Date    NITRU Negative 07/29/2020    WBCUA  07/29/2020    BACTERIA 2+ 07/29/2020    RBCUA 11-20 07/29/2020    BLOODU LARGE 07/29/2020    SPECGRAV >=1.030 07/29/2020    GLUCOSEU Negative 07/29/2020     Radiology:     CXR: I have reviewed the CXR with the following interpretation: N/A    EKG:  I have reviewed the EKG with the following interpretation: N/A    CT ABDOMEN PELVIS W IV CONTRAST Additional Contrast? None   Final Result   Right hydronephrosis and right hydroureter with obstructing 4 mm distal right   ureteral stone.            ASSESSMENT:    Active Hospital Problems    Diagnosis Date Noted    Hypertension [I10]     Kidney stone [N20.0]     Morbid obesity ([address] [E66.01]     Acute cystitis [N30.00]      PLAN:    Right flank pain in setting of nephrolithiasis  -CT abdomen pelvis revealed: right hydronephrosis and right hydroureter with obstructing 4 MM distal right ureteral stone  -1 L LR given in ED  -morphine given in ED  -NPO  -MIVF  -PRN pain medication  -urology consulted in ED - appreciate recommendations in advance    Acute cystitis with hematuria  -UA positive  WBC, and 2+ bacteria  -cefepime given in ED and continued 7/29/2020  -urine culture pending    Essential HTN  -continue lisinopril    Hypothyroidism  -continue levothyroxine    Morbid obesity  With Body mass index is 52.4 kg/m². Complicating assessment and treatment. Placing patient at risk for multiple co-morbidities as well as early death and contributing to the patient's presentation. Counseled on weight loss    Leukocytosis, 12.6 on admission  -2/2 above  -abx as indicated above  -cbc in am  -blood cultures ordered in ED     DVT Prophylaxis: Lovenox    Diet: Diet NPO Effective Now Exceptions are: Ice Chips     Code Status: No Order    PT/OT Eval Status: No indication for need at this time    Dispo - 2-3 days pending clinical improvement     SUSANNA Hoff - CNP    Thank you Shree Moreno for the opportunity to be involved in this patient's care.  If you have any questions or concerns please feel free to contact me at [phone].  -----------------------------Anticipated Dr. Valerie daly--------------------------------------------------

## 2020-07-30 NOTE — ANESTHESIA PRE PROCEDURE
Department of Anesthesiology  Preprocedure Note       Name:  Johnson Chawla   Age:  60 y.o.  :  1965                                          MRN:  [id number]         Date:  2020      Surgeon: Gabriela Solomon):  Anisha Briceño MD    Procedure: Procedure(s):  CYSTOSCOPY, RIGHT STENT PLACEMENT, POSSIBLE URETEROSCOPY, POSSIBLE HOLMIUM LASER    Medications prior to admission:   Prior to Admission medications    Medication Sig Start Date End Date Taking? Authorizing Provider   levothyroxine (SYNTHROID) 150 MCG tablet TAKE 1 TABLET BY MOUTH ONCE DAILY 20  Yes Historical Provider, MD   Calcium Carbonate-Vitamin D 500-400 MG-UNIT TABS Take 600 mg by mouth 2 times daily   Yes Historical Provider, MD   albuterol sulfate  (90 Base) MCG/ACT inhaler Inhale 2 puffs into the lungs every 4 hours as needed 20  Yes Historical Provider, MD   lisinopril-hydrochlorothiazide (PRINZIDE;ZESTORETIC) 10-12.5 MG per tablet Take by mouth 16  Yes Historical Provider, MD   calcitRIOL (ROCALTROL) 0.5 MCG capsule Take 0.5 mcg by mouth daily.    Yes Historical Provider, MD       Current medications:    Current Facility-Administered Medications   Medication Dose Route Frequency Provider Last Rate Last Dose    calcitRIOL (ROCALTROL) capsule 0.5 mcg  0.5 mcg Oral Daily SUSANNA Aguillon - CNP   0.5 mcg at 20 3978    levothyroxine (SYNTHROID) tablet 150 mcg  150 mcg Oral Daily SUSANNA Aguillon CNP   150 mcg at 20 6184    sodium chloride flush 0.9 % injection 10 mL  10 mL Intravenous 2 times per day SUSANNA Aguillon CNP   10 mL at 20 1056    sodium chloride flush 0.9 % injection 10 mL  10 mL Intravenous PRN SUSANNA Aguillon CNP        acetaminophen (TYLENOL) tablet 650 mg  650 mg Oral Q6H PRN SUSANNA Aguillon CNP        Or    acetaminophen (TYLENOL) suppository 650 mg  650 mg Rectal Q6H PRN SUSANNA Aguillon CNP        polyethylene glycol (GLYCOLAX) packet 17 g  17 g Oral Daily PRN SUSANNA Zimmerman CNP        promethazine (PHENERGAN) tablet 12.5 mg  12.5 mg Oral Q6H PRN SUSANNA Zimmerman CNP        Or    ondansetron (ZOFRAN) injection 4 mg  4 mg Intravenous Q6H PRN SUSANNA Zimmerman - CNP   4 mg at 20 0835    enoxaparin (LOVENOX) injection 40 mg  40 mg Subcutaneous Daily SUSANNA Zimmerman CNP   Stopped at 20 0931    0.9 % sodium chloride infusion   Intravenous Continuous Jacklyn Boast,  mL/hr at 20 1136      cefepime (MAXIPIME) 1 g IVPB minibag  1 g Intravenous Q12H SUSANNA Zimmerman  mL/hr at 20 1135 1 g at 20 1135    ketorolac (TORADOL) injection 30 mg  30 mg Intravenous Q6H PRN SUSANNA Zimmerman CNP   30 mg at 20 1143       Allergies: Allergies   Allergen Reactions    Penicillins        Problem List:    Patient Active Problem List   Diagnosis Code    Hypertension I10    Kidney stone N20.0    Morbid obesity ([facility] [address] E66.01    Acute cystitis N30.00    Nephrolithiasis N20.0       Past Medical History:        Diagnosis Date    Cancer ([facility] [address]     thyroid    Hypertension     Thyroid disease     S/P thyroidectomy due to cancer       Past Surgical History:        Procedure Laterality Date    DILATION AND CURETTAGE OF UTERUS  10/8/2013    hysteroscopy  endometrial  ablation    KNEE ARTHROSCOPY Left     THYROIDECTOMY, COMPLETION  2000    TONSILLECTOMY         Social History:    Social History     Tobacco Use    Smoking status: Former Smoker     Last attempt to quit: 10/7/1995     Years since quittin.8    Smokeless tobacco: Never Used   Substance Use Topics    Alcohol use:  No                                Counseling given: Not Answered      Vital Signs (Current):   Vitals:    20 2223 20 0351 20 0840 20 1108   BP: (!) 146/77 (!) 97/57 (!) 102/58 116/62   Pulse: 121 101 100 93   Resp: 18 18 20 18   Temp: 99.8 °F (37.7 °C) 99.5 °F (37.5 °C) 99.5 °F (37.5 °C) 99.6 °F (37.6 °C) TempSrc: Oral Oral Oral Oral   SpO2: 94% 92% 93% 94%   Weight: (!) 347 lb 4 oz (157.5 kg)      Height: 5' 6\" (1.676 m)                                                 BP Readings from Last 3 Encounters:   07/30/20 116/62   01/01/17 140/77   10/08/13 140/85       NPO Status: Time of last liquid consumption: 1000                        Time of last solid consumption: 1800                        Date of last liquid consumption: 07/30/20                        Date of last solid food consumption: 07/28/20    BMI:   Wt Readings from Last 3 Encounters:   07/29/20 (!) 347 lb 4 oz (157.5 kg)   01/01/17 300 lb (136.1 kg)   10/08/13 300 lb (136.1 kg)     Body mass index is 56.05 kg/m². CBC:   Lab Results   Component Value Date    WBC 33.5 07/30/2020    RBC 4.63 07/30/2020    HGB 13.2 07/30/2020    HCT 40.9 07/30/2020    MCV 88.3 07/30/2020    RDW 14.8 07/30/2020     07/30/2020       CMP:   Lab Results   Component Value Date     07/30/2020    K 3.6 07/30/2020     07/30/2020    CO2 28 07/30/2020    BUN 25 07/30/2020    CREATININE 1.8 07/30/2020    GFRAA 35 07/30/2020    GFRAA >60 02/01/2012    AGRATIO 1.2 07/29/2020    LABGLOM 29 07/30/2020    GLUCOSE 111 07/30/2020    PROT 7.9 07/29/2020    PROT 7.6 02/01/2012    CALCIUM 9.0 07/30/2020    BILITOT 0.4 07/29/2020    ALKPHOS 91 07/29/2020    AST 25 07/29/2020    ALT 27 07/29/2020       POC Tests: No results for input(s): POCGLU, POCNA, POCK, POCCL, POCBUN, POCHEMO, POCHCT in the last 72 hours.     Coags: No results found for: PROTIME, INR, APTT    HCG (If Applicable):   Lab Results   Component Value Date    PREGTESTUR Negative 10/08/2013        ABGs: No results found for: PHART, PO2ART, RRD1YPR, NLM9PSG, BEART, D1LPOQJO     Type & Screen (If Applicable):  No results found for: LABABO, LABRH    Drug/Infectious Status (If Applicable):  No results found for: HIV, HEPCAB    COVID-19 Screening (If Applicable):   Lab Results   Component Value Date    COVID19 Not Detected 2020         Anesthesia Evaluation  Patient summary reviewed and Nursing notes reviewed no history of anesthetic complications:   Airway: Mallampati: III     Neck ROM: full   Dental:          Pulmonary:Negative Pulmonary ROS and normal exam                               Cardiovascular:Negative CV ROS    (+) hypertension:,                   Neuro/Psych:   Negative Neuro/Psych ROS              GI/Hepatic/Renal: Neg GI/Hepatic/Renal ROS       (-) hiatal hernia and GERD       Endo/Other: Negative Endo/Other ROS   (+) hypothyroidism::., .                 Abdominal:           Vascular:                                        Anesthesia Plan      general     ASA 3     (I discussed with the patient the risks and benefits of PIV, general anesthesia, IV Narcotics, PACU. All questions were answered the patient agrees with the plan and wishes to proceed.  )  Induction: intravenous. Pre-Operative Diagnosis: Nephrolithiasis [N20.0]; Nephrolithiasis [N20.0]    60 y.o.   BMI:  Body mass index is 56.05 kg/m².      Vitals:    20 2223 20 0351 20 0840 20 1108   BP: (!) 146/77 (!) 97/57 (!) 102/58 116/62   Pulse: 121 101 100 93   Resp: 18 18 20 18   Temp: 99.8 °F (37.7 °C) 99.5 °F (37.5 °C) 99.5 °F (37.5 °C) 99.6 °F (37.6 °C)   TempSrc: Oral Oral Oral Oral   SpO2: 94% 92% 93% 94%   Weight: (!) 347 lb 4 oz (157.5 kg)      Height: 5' 6\" (1.676 m)          Allergies   Allergen Reactions    Penicillins        Social History     Tobacco Use    Smoking status: Former Smoker     Last attempt to quit: 10/7/1995     Years since quittin.8    Smokeless tobacco: Never Used   Substance Use Topics    Alcohol use: No       LABS:    CBC  Lab Results   Component Value Date/Time    WBC 33.5 (H) 2020 03:23 AM    HGB 13.2 2020 03:23 AM    HCT 40.9 2020 03:23 AM     2020 03:23 AM     RENAL  Lab Results   Component Value Date/Time     2020 03:23 AM

## 2020-07-30 NOTE — OP NOTE
Operative Note      Patient: Lilia Caba  YOB: 1965  MRN: [id number]    Date of Procedure: 7/30/2020    Pre-Operative Diagnosis: right nephrolithiasis (ureter stone 4mm), fevers  Post-Operative Diagnosis: same, Right Pyonephrosis         Procedure Performed:  1) Cystourethroscopy with right ureteral stent placement    2) fluoroscopy less than 1 hr     Anesthesia: General  Surgeons/Assistants: scrub   Estimated Blood Loss: less than 2cc   Intravenous Fluids: per anesthesia   Complications: None     Specimens: none     Findings:   -Bladder with cloudy urine  -stent in collecting system on fluoro, Right Pyonephrosis     Drains/Tubes: 6 Bel[place]ian x 24 cm JJ ureteral stent       Description of Procedure:  After obtaining informed consent, the patient was brought to the operating room and placed supine on the operating room table. After adequate anesthesia, the patient was repositioned in the dorsal lithotomy position and prepped & draped in the standard surgical fashion. Standard sugical time out was performed. I began the case by doing rigid cystoscopy with a 21-Belarusian sheath and a 30-degree lens. Bladder/urethra as above. Both ureteral orifices were in normal orthotopic position. I then concentrated on the ureteral orifice. I then placed guidewire into the upper ureter and immediately fibrinous thick cloudy urine drained, pyonephrosis from kidney. I then advanced a JJ ureteral stent over the wire. An appropriate stent curl was created within the renal pelvis and within the bladder. The bladder was then emptied with after lebron catheter insertion. 5cc were put in balloon. We will leave to gravity until clinically improves. The patient was then awakened from anesthesia and brought to the PACU in stable condition. There were no apparent complications. Follow up:  -definitive stone intervention in 1-2 weeks.      Grey Hoffman MD  Office: [phone]     Electronically signed by

## 2020-07-30 NOTE — CONSULTS
Urology Consult Note      Reason for Consult:  UTI, distal ureteral stone    History:   Pt is a 59 yo WF with no prior history of stones who presented to the ED with right flank pain. CT a/p showed a 4mm distal right ureteral stone with hydronephrosis. UA with  WBC and 2+ bacteria. WBC has risen today to 33, Cr up to 1.8. Temp 99.5 and BP is soft. She is on cefepime with blood and urine cultures pending. She continues to have pain but denies any UTI symptoms. Meds: see med rec  Family History, Social History, Review of Systems:  Reviewed and agreed to as per chart    Exam:    Vitals:  BP (!) 102/58   Pulse 100   Temp 99.5 °F (37.5 °C) (Oral)   Resp 20   Ht 5' 6\" (1.676 m)   Wt (!) 347 lb 4 oz (157.5 kg)   LMP 2016   SpO2 93%   BMI 56.05 kg/m²   Temp  Av.7 °F (37.6 °C)  Min: 97.9 °F (36.6 °C)  Max: 102 °F (38.9 °C)    Intake/Output Summary (Last 24 hours) at 2020 1007  Last data filed at 2020 0644  Gross per 24 hour   Intake 1341 ml   Output 400 ml   Net 941 ml        Physical:   Well developed, well nourished in no acute distress   Mood indicates no abnormalities. Pt doesnt appear depressed   Orientated to time and place   Neck is supple, trachea is midline   Respiratory effort is normal   Cardiovascular show no extremity swelling   Abdomen no masses or hernias are palpated, there is no tenderness. Liver and Spleen appear normal.   Skin show no abnormal lesions   Lymph nodes are not palpated in the inguinal, neck, or axillary area.         Labs:  WBC:    Lab Results   Component Value Date    WBC 33.5 2020     Hemoglobin/Hematocrit:    Lab Results   Component Value Date    HGB 13.2 2020    HCT 40.9 2020     BMP:    Lab Results   Component Value Date     2020    K 3.6 2020     2020    CO2 28 2020    BUN 25 2020    LABALBU 4.3 2020    CREATININE 1.8 2020    CALCIUM 9.0 2020    GFRAA 35 07/30/2020    GFRAA >60 02/01/2012    LABGLOM 29 07/30/2020     PT/INR:  No results found for: PROTIME, INR  PTT:  No results found for: APTT[APTT    Urinalysis:     Imaging:    XAMINATION:    CT OF THE ABDOMEN AND PELVIS WITH CONTRAST 7/29/2020 7:51 pm         TECHNIQUE:    CT of the abdomen and pelvis was performed with the administration of    intravenous contrast. Multiplanar reformatted images are provided for review. Dose modulation, iterative reconstruction, and/or weight based adjustment of    the mA/kV was utilized to reduce the radiation dose to as low as reasonably    achievable.         COMPARISON:    02/08/2008         HISTORY:    ORDERING SYSTEM PROVIDED HISTORY: R sided pain, infected ua, appy vs pyelo vs    stone vs GB? TECHNOLOGIST PROVIDED HISTORY:    If patient is on cardiac monitor and/or pulse ox, they may be taken off    cardiac monitor and pulse ox, left on O2 if currently on.  All monitors    reattached when patient returns to room. Additional Contrast?->None    Reason for exam:->R sided pain, infected ua, appy vs pyelo vs stone vs GB?     Reason for Exam: RT flank pain x 2 days    Acuity: Acute    Type of Exam: Initial    Additional signs and symptoms: vomiting today,hematuria         FINDINGS:    Lower Chest: Left lower lobe scarring.         Organs: Again identified is a hypodense 3.6 x 3.1 cm left hepatic lobe lesion    with peripheral nodular enhancement most compatible with benign hemangioma.         The spleen, pancreas, gallbladder, adrenal glands, and left kidneys show no    acute abnormality.         There is right perinephric and periureteral stranding as well as mild right    hydronephrosis with a distal right ureteral obstructing 4 mm.  There is also    delayed excretion contrast of the right kidney.  There is a distal right    ureteral 4 mm stone.         No other calcified urinary collecting system stones suspected.         There is a benign 4.2 cm right renal cortical cyst again identified.         GI/Bowel: Small hiatal hernia suspected.  The bowel otherwise shows normal    caliber and course without suspected wall thickening.  Normal appendix. There are scattered colonic diverticula, most numerous of the sigmoid colon,    without evidence of active inflammation.         Pelvis: A lobulated contour of the uterus is suggestive of fibroids.  The    urinary bladder is mostly collapsed.         Peritoneum/Retroperitoneum: The aorta is normal in caliber and course,    showing calcifications.         Bones/Soft Tissues:  There is mild 4 mm L4-5 degenerative anterolisthesis.  No    acute bony abnormality.  No free fluid or adenopathy.              Impression    Right hydronephrosis and right hydroureter with obstructing 4 mm distal right    ureteral stone.               Impression/Plan:   Right distal ureteral stone, UTI  NPO   Cystoscopy, right ureteral stent placement today with Dr. Britt Don PA-C

## 2020-07-30 NOTE — ED NOTES
[facility] @ 2115   Re:  infected kidney stone  NP padilla ballesteros @ 2117     [place]  07/29/20 2120

## 2020-07-30 NOTE — PROGRESS NOTES
[facility] or Facility: [facility] From: Curtis Epps RE: Areli Arceo 1965 RM: [id number] FYI- All 4 blood culture bottles came back positive with Enterobacteriaceae.  Need Callback: NO CALLBACK REQ C3 ONCOLOGY  Read 11:53 AM

## 2020-07-30 NOTE — PROGRESS NOTES
After my initial assessment I was notified by Dr. Andrés Torres in the emergency room that the patient is now appearing to be septic. A lactic acid was sent off that resulted at 3.6 and Dr. Andrés Torres stated he ordered additional IV fluids. The patient's heart rate was 110, temp 102 and WBC 12.6 at that time. A repeat lactic has been ordered and the patient will be placed on tele.    Gregory Hernandez CNP

## 2020-07-30 NOTE — CARE COORDINATION
CASE MANAGEMENT INITIAL ASSESSMENT      Reviewed chart and completed assessment with:  patient  Explained Case Management role/services. Primary contact information: Lynn Meneses, boyfriend    Admit date/status: 7/29  Diagnosis: nephrolithiasis  Is this a Readmission?:  no    Insurance: [address] [place] [address] required for SNF - Y      3 night stay required - N    Living arrangements, Adls, care needs, prior to admission: Lived with boyfriend in a ranch style home; [facility]    Transportation: private    [address] [place] [address] at home: Walker__Cane__RTS__ BSC__Shower Chair__  02__ HHN__ CPAP__  BiPap__  Hospital Bed__ W/C___ Other__________    Services in the home and/or outpatient, prior to admission: none    Dialysis Facility (if applicable) none      PT/OT recs: none    Hospital Exemption Notification (HEN): Not started at this time    Barriers to discharge: none    Plan/comments: Sera Bhatia, lives with boyfriend in ranch home, anticipating no DCP needs. Will follow for further possible needs.      ECOC on chart for MD signature

## 2020-07-30 NOTE — ANESTHESIA POSTPROCEDURE EVALUATION
Department of Anesthesiology  Postprocedure Note    Patient: Jackie Ramos  MRN: [id number]  YOB: 1965  Date of evaluation: 7/30/2020  Time:  7:12 PM     Procedure Summary     Date:  07/30/20 Room / Location:  [address]    Anesthesia Start:  2937 Anesthesia Stop:  7544    Procedure:  CYSTOSCOPY, RIGHT STENT PLACEMENT (Right ) Diagnosis:  (RIGHT URETERAL STONE)    Surgeon:  Arash Sutton MD Responsible Provider:  Chris Scanlon MD    Anesthesia Type:  general ASA Status:  3          Anesthesia Type: general    Lucero Phase I: Lucero Score: 10    Lucero Phase II:      Last vitals: Reviewed and per EMR flowsheets.        Anesthesia Post Evaluation    Comments: Postoperative Anesthesia Note    Name:    Jackie Ramos  MRN:      [id number]    Patient Vitals in the past 12 hrs:  07/30/20 1615, BP:119/73, Temp:98.8 °F (37.1 °C), Temp src:Oral, Pulse:83, Resp:18, SpO2:93 %  07/30/20 1501, BP:127/61, Temp:98.2 °F (36.8 °C), Temp src:Temporal, Pulse:91, Resp:19, SpO2:94 %  07/30/20 1455, BP:(!) 116/59, Pulse:93, Resp:21, SpO2:92 %  07/30/20 1450, BP:112/66, Pulse:94, Resp:27, SpO2:93 %  07/30/20 1448, Pulse:92, Resp:22, SpO2:96 %  07/30/20 1447, Pulse:94, Resp:25, SpO2:93 %  07/30/20 1444, BP:116/64, Pulse:94, Resp:24, SpO2:92 %  07/30/20 1440, BP:103/68, Pulse:94, Resp:24, SpO2:92 %  07/30/20 1435, BP:(!) 104/55, Pulse:92, Resp:18, SpO2:96 %  07/30/20 1430, BP:(!) 90/52, Pulse:92, Resp:21, SpO2:95 %  07/30/20 1425, BP:(!) 91/56, Pulse:93, Resp:23, SpO2:95 %  07/30/20 1420, BP:(!) 107/44, Pulse:95, Resp:19, SpO2:95 %  07/30/20 1417, BP:(!) 108/47, Temp:97.7 °F (36.5 °C), Temp src:Temporal, Pulse:97, Resp:20, SpO2:98 %  07/30/20 1108, BP:116/62, Temp:99.6 °F (37.6 °C), Temp src:Oral, Pulse:93, Resp:18, SpO2:94 %  07/30/20 0840, BP:(!) 102/58, Temp:99.5 °F (37.5 °C), Temp src:Oral, Pulse:100, Resp:20, SpO2:93 %     LABS:    CBC  Lab Results       Component                Value

## 2020-07-30 NOTE — PROGRESS NOTES
Hospitalist Progress Note      PCP: Kings Butt    Date of Admission: 7/29/2020    Chief Complaint: Right flank pain    Hospital Course:  60 y.o. female, with PMH of HTN and morbid obesity, who presented to [facility] with right flank pain. History obtained from the patient and review of EMR. The patient stated 3 to 4 days ago she urinated and noticed her urine was a different color. She stated at that time she was not too concerned, she just thought she did not drink enough water. The patient stated 2 days ago she did have some blood in her urine and again did not seem concerned. However, the patient stated yesterday she began to have some right flank pain that was associated with nausea. She stated this morning she was woken up to the right flank pain that radiated around to her right lower quadrant. The patient stated her pain was associated with nausea and vomiting. She stated she did have 3 episodes of vomiting and therefore called her PCP. The patient stated her PCP referred her to the urgent care and when she arrived in urgent care they told her she needed to go to the emergency room for a CT scan. In the emergency room the patient had a CT of her abdomen and pelvis that revealed right hydronephrosis and right hydroureter with obstructing 4 MM distal right ureteral stone. She was given an antiemetic and IV pain medication. Urology was consulted and the patient was made n.p.o. She was started on cefepime. The patient will be admitted for further evaluation. She denied any other associated symptoms as well as any aggravating and/or alleviating factors. At the time of this assessment, the patient was resting comfortably in bed. She currently denies any chest pain, back pain, abdominal pain, shortness of breath, numbness, tingling, N/V/C/D, fever and/or chills.      Subjective: Right loin to groin pain is controlled with pain medication, no nausea vomiting or change in mental status at 40.9    150     Recent Labs     07/29/20  1647 07/30/20  0323    139   K 3.9 3.6    101   CO2 30 28   BUN 21* 25*   CREATININE 1.0 1.8*   CALCIUM 9.4 9.0     Recent Labs     07/29/20  1647   AST 25   ALT 27   BILITOT 0.4   ALKPHOS 91     No results for input(s): INR in the last 72 hours. No results for input(s): Daleen Cocks in the last 72 hours. Urinalysis:      Lab Results   Component Value Date    NITRU Negative 07/29/2020    WBCUA  07/29/2020    BACTERIA 2+ 07/29/2020    RBCUA 11-20 07/29/2020    BLOODU LARGE 07/29/2020    SPECGRAV >=1.030 07/29/2020    GLUCOSEU Negative 07/29/2020       Radiology:  CT ABDOMEN PELVIS W IV CONTRAST Additional Contrast? None   Final Result   Right hydronephrosis and right hydroureter with obstructing 4 mm distal right   ureteral stone. XR ABDOMEN (KUB) (SINGLE AP VIEW)    (Results Pending)           Assessment/Plan:    Active Hospital Problems    Diagnosis    Nephrolithiasis [N20.0]    Hypertension [I10]    Kidney stone [N20.0]    Morbid obesity ([facility] [address] [E66.01]    Acute cystitis [N30.00]     Right flank pain in setting of nephrolithiasis  -CT abdomen pelvis revealed: right hydronephrosis and right hydroureter with obstructing 4 MM distal right ureteral stone  -IV fluids  -NPO  -PRN pain medication  -urology consulted in ED - appreciate recommendations in advance     Acute cystitis with hematuria  -UA positive  WBC, and 2+ bacteria  -cefepime given in ED and continued 7/29/2020  -urine culture pending    Marked leukocytosis-secondary to above, monitor with antibiotics and IV fluids       Essential HTN  -Hold lisinopril and hydrochlorothiazide secondary to MARISA, monitor blood pressure currently her blood pressure is lower limit of normal     Hypothyroidism  -continue levothyroxine     Morbid obesity  With Body mass index is 44.5 kg/m². Complicating assessment and treatment.  Placing patient at risk for multiple co-morbidities as

## 2020-07-31 LAB
ANION GAP SERPL CALCULATED.3IONS-SCNC: 10 MMOL/L (ref 3–16)
BASOPHILS ABSOLUTE: 0.1 K/UL (ref 0–0.2)
BASOPHILS RELATIVE PERCENT: 0.3 %
BUN BLDV-MCNC: 33 MG/DL (ref 7–20)
CALCIUM SERPL-MCNC: 8 MG/DL (ref 8.3–10.6)
CHLORIDE BLD-SCNC: 103 MMOL/L (ref 99–110)
CO2: 26 MMOL/L (ref 21–32)
CREAT SERPL-MCNC: 1.6 MG/DL (ref 0.6–1.1)
EOSINOPHILS ABSOLUTE: 0 K/UL (ref 0–0.6)
EOSINOPHILS RELATIVE PERCENT: 0 %
GFR AFRICAN AMERICAN: 40
GFR NON-AFRICAN AMERICAN: 33
GLUCOSE BLD-MCNC: 205 MG/DL (ref 70–99)
HCT VFR BLD CALC: 38.6 % (ref 36–48)
HEMOGLOBIN: 12.5 G/DL (ref 12–16)
LYMPHOCYTES ABSOLUTE: 1.2 K/UL (ref 1–5.1)
LYMPHOCYTES RELATIVE PERCENT: 3.9 %
MCH RBC QN AUTO: 28.6 PG (ref 26–34)
MCHC RBC AUTO-ENTMCNC: 32.3 G/DL (ref 31–36)
MCV RBC AUTO: 88.6 FL (ref 80–100)
MONOCYTES ABSOLUTE: 0.7 K/UL (ref 0–1.3)
MONOCYTES RELATIVE PERCENT: 2.2 %
NEUTROPHILS ABSOLUTE: 29 K/UL (ref 1.7–7.7)
NEUTROPHILS RELATIVE PERCENT: 93.6 %
ORGANISM: ABNORMAL
PDW BLD-RTO: 15.4 % (ref 12.4–15.4)
PLATELET # BLD: 130 K/UL (ref 135–450)
PMV BLD AUTO: 13 FL (ref 5–10.5)
POTASSIUM SERPL-SCNC: 4 MMOL/L (ref 3.5–5.1)
RBC # BLD: 4.35 M/UL (ref 4–5.2)
SODIUM BLD-SCNC: 139 MMOL/L (ref 136–145)
URINE CULTURE, ROUTINE: ABNORMAL
WBC # BLD: 31 K/UL (ref 4–11)

## 2020-07-31 PROCEDURE — 85025 COMPLETE CBC W/AUTO DIFF WBC: CPT

## 2020-07-31 PROCEDURE — 80048 BASIC METABOLIC PNL TOTAL CA: CPT

## 2020-07-31 PROCEDURE — 1200000000 HC SEMI PRIVATE

## 2020-07-31 PROCEDURE — 6360000002 HC RX W HCPCS: Performed by: INTERNAL MEDICINE

## 2020-07-31 PROCEDURE — 6370000000 HC RX 637 (ALT 250 FOR IP): Performed by: NURSE PRACTITIONER

## 2020-07-31 PROCEDURE — 2580000003 HC RX 258: Performed by: INTERNAL MEDICINE

## 2020-07-31 PROCEDURE — 36415 COLL VENOUS BLD VENIPUNCTURE: CPT

## 2020-07-31 PROCEDURE — 6360000002 HC RX W HCPCS: Performed by: NURSE PRACTITIONER

## 2020-07-31 PROCEDURE — 99253 IP/OBS CNSLTJ NEW/EST LOW 45: CPT | Performed by: INTERNAL MEDICINE

## 2020-07-31 RX ADMIN — KETOROLAC TROMETHAMINE 30 MG: 30 INJECTION, SOLUTION INTRAMUSCULAR at 15:53

## 2020-07-31 RX ADMIN — SODIUM CHLORIDE: 9 INJECTION, SOLUTION INTRAVENOUS at 23:51

## 2020-07-31 RX ADMIN — MEROPENEM 1 G: 1 INJECTION, POWDER, FOR SOLUTION INTRAVENOUS at 05:41

## 2020-07-31 RX ADMIN — SODIUM CHLORIDE: 9 INJECTION, SOLUTION INTRAVENOUS at 15:48

## 2020-07-31 RX ADMIN — SODIUM CHLORIDE: 9 INJECTION, SOLUTION INTRAVENOUS at 05:41

## 2020-07-31 RX ADMIN — CEFTRIAXONE SODIUM 2 G: 2 INJECTION, POWDER, FOR SOLUTION INTRAMUSCULAR; INTRAVENOUS at 18:51

## 2020-07-31 RX ADMIN — CALCITRIOL 0.5 MCG: 0.25 CAPSULE ORAL at 10:13

## 2020-07-31 RX ADMIN — ENOXAPARIN SODIUM 40 MG: 40 INJECTION SUBCUTANEOUS at 10:13

## 2020-07-31 RX ADMIN — LEVOTHYROXINE SODIUM 150 MCG: 0.15 TABLET ORAL at 10:14

## 2020-07-31 ASSESSMENT — PAIN SCALES - GENERAL: PAINLEVEL_OUTOF10: 4

## 2020-07-31 NOTE — PLAN OF CARE
Problem: Pain:  Goal: Pain level will decrease  Description: Pain level will decrease  Outcome: Ongoing  Note: Pt denies any pain at this time, denies the needs for prn intervention. Will continue to monitor and reassess as needed.

## 2020-07-31 NOTE — PROGRESS NOTES
Shift assessment completed and charted. VSS. F/C low UO noted, pattie/bloody/sediment urine noted. Per pt pain improved. IS provided. Bed locked and in lowest position. Call light within reach. Pt denies any other needs at this time. Will continue to monitor.

## 2020-07-31 NOTE — FLOWSHEET NOTE
07/31/20 1418   Encounter Summary   Services provided to: Patient not available   Referral/Consult From: [address] [employer]   (Judaism)   Continue Visiting   (no answer on room phone)   Complexity of Encounter Low   Length of Encounter 15 minutes   Spiritual/Confucianism   Intervention Prayer

## 2020-07-31 NOTE — PROGRESS NOTES
Urology Progress Note      Subjective:   Pt has no c/o  Tolerating stent ok    Vitals:  /67   Pulse 89   Temp 98.5 °F (36.9 °C) (Oral)   Resp 18   Ht 5' 6\" (1.676 m)   Wt (!) 347 lb 4 oz (157.5 kg)   LMP 2016   SpO2 94%   BMI 56.05 kg/m²   Temp  Av.5 °F (36.9 °C)  Min: 97.7 °F (36.5 °C)  Max: 99.6 °F (37.6 °C)    Intake/Output Summary (Last 24 hours) at 2020 0915  Last data filed at 2020 0802  Gross per 24 hour   Intake 4541.25 ml   Output 750 ml   Net 3791.25 ml       Exam:   Lebron in place with red urine    Labs:  WBC:    Lab Results   Component Value Date    WBC 44.4 2020     Hemoglobin/Hematocrit:    Lab Results   Component Value Date    HGB 12.8 2020    HCT 39.3 2020     BMP:    Lab Results   Component Value Date     2020    K 3.6 2020     2020    CO2 28 2020    BUN 25 2020    LABALBU 4.3 2020    CREATININE 1.8 2020    CALCIUM 9.0 2020    GFRAA 35 2020    GFRAA >60 2012    LABGLOM 29 2020     PT/INR:  No results found for: PROTIME, INR  PTT:  No results found for: APTT[APTT    Urinalysis:     Urine Culture:  Citrobacter, sensitivities pending    Blood Culture:  Enterobacteriaceae DNA Detected, sensitivities pending    Antibiotic Therapy:  merrem    Imaging:       Impression/Plan:   S/p right ureteral stent for infected stone  WBC 44, temps down  Cultures as above  Continue lebron for now    Naty Cohen PA-C

## 2020-07-31 NOTE — PROGRESS NOTES
Hospitalist Progress Note      PCP: Zahraa Alexandra    Date of Admission: 7/29/2020    Chief Complaint: Right flank pain    Hospital Course:  60 y.o. female, with PMH of HTN and morbid obesity, who presented to [facility] with right flank pain. History obtained from the patient and review of EMR. The patient stated 3 to 4 days ago she urinated and noticed her urine was a different color. She stated at that time she was not too concerned, she just thought she did not drink enough water. The patient stated 2 days ago she did have some blood in her urine and again did not seem concerned. However, the patient stated yesterday she began to have some right flank pain that was associated with nausea. She stated this morning she was woken up to the right flank pain that radiated around to her right lower quadrant. The patient stated her pain was associated with nausea and vomiting. She stated she did have 3 episodes of vomiting and therefore called her PCP. The patient stated her PCP referred her to the urgent care and when she arrived in urgent care they told her she needed to go to the emergency room for a CT scan. In the emergency room the patient had a CT of her abdomen and pelvis that revealed right hydronephrosis and right hydroureter with obstructing 4 MM distal right ureteral stone. She was given an antiemetic and IV pain medication. Urology was consulted and the patient was made n.p.o. She was started on cefepime. The patient will be admitted for further evaluation. She denied any other associated symptoms as well as any aggravating and/or alleviating factors. At the time of this assessment, the patient was resting comfortably in bed. She currently denies any chest pain, back pain, abdominal pain, shortness of breath, numbness, tingling, N/V/C/D, fever and/or chills.      Subjective: no pain ,nausea vomiting , fever or change in mental status at this time, has Gallegos and has hematuria      Medications:  Reviewed    Infusion Medications    sodium chloride 125 mL/hr at 07/31/20 0541     Scheduled Medications    calcitRIOL  0.5 mcg Oral Daily    meropenem  1 g Intravenous Q12H    levothyroxine  150 mcg Oral Daily    sodium chloride flush  10 mL Intravenous 2 times per day    enoxaparin  40 mg Subcutaneous Daily     PRN Meds: sodium chloride flush, acetaminophen **OR** acetaminophen, polyethylene glycol, promethazine **OR** ondansetron, ketorolac      Intake/Output Summary (Last 24 hours) at 7/31/2020 1317  Last data filed at 7/31/2020 0802  Gross per 24 hour   Intake 4541.25 ml   Output 600 ml   Net 3941.25 ml       Physical Exam Performed:    /79   Pulse 83   Temp 97.5 °F (36.4 °C) (Oral)   Resp 16   Ht 5' 6\" (1.676 m)   Wt (!) 347 lb 4 oz (157.5 kg)   LMP 11/24/2016   SpO2 96%   BMI 56.05 kg/m²     General appearance: No apparent distress, appears stated age and cooperative. Obese  HEENT: Pupils equal, round, and reactive to light. Conjunctivae/corneas clear. Neck: Supple, with full range of motion. No jugular venous distention. Trachea midline. Respiratory:  Normal respiratory effort. Clear to auscultation, bilaterally without Rales/Wheezes/Rhonchi. Cardiovascular: Regular rate and rhythm with normal S1/S2 without murmurs, rubs or gallops. Abdomen: Soft, non-tender, non-distended with normal bowel sounds. Musculoskeletal: No clubbing, cyanosis or edema bilaterally. Full range of motion without deformity. Skin: Skin color, texture, turgor normal.  No rashes or lesions. Neurologic:  Neurovascularly intact without any focal sensory/motor deficits.  Cranial nerves: II-XII intact, grossly non-focal.  Psychiatric: Alert and oriented, thought content appropriate, normal insight  Capillary Refill: Brisk,< 3 seconds   Peripheral Pulses: +2 palpable, equal bilaterally       Labs:   Recent Labs     07/30/20  0323 07/30/20  1640 07/31/20  0952   WBC 33.5* 44.4* 31.0* HGB 13.2 12.8 12.5   HCT 40.9 39.3 38.6    144 130*     Recent Labs     07/29/20  1647 07/30/20  0323 07/31/20  0952    139 139   K 3.9 3.6 4.0    101 103   CO2 30 28 26   BUN 21* 25* 33*   CREATININE 1.0 1.8* 1.6*   CALCIUM 9.4 9.0 8.0*     Recent Labs     07/29/20  1647   AST 25   ALT 27   BILITOT 0.4   ALKPHOS 91     No results for input(s): INR in the last 72 hours. No results for input(s): Dee Dee Spice in the last 72 hours. Urinalysis:      Lab Results   Component Value Date    NITRU Negative 07/29/2020    WBCUA  07/29/2020    BACTERIA 2+ 07/29/2020    RBCUA 11-20 07/29/2020    BLOODU LARGE 07/29/2020    SPECGRAV >=1.030 07/29/2020    GLUCOSEU Negative 07/29/2020       Radiology:  XR ABDOMEN (KUB) (SINGLE AP VIEW)   Final Result   Right ureteral catheter is noted in normal position. Please refer to   procedure notes for additional details. CT ABDOMEN PELVIS W IV CONTRAST Additional Contrast? None   Final Result   Right hydronephrosis and right hydroureter with obstructing 4 mm distal right   ureteral stone.          FLUORO FOR SURGICAL PROCEDURES    (Results Pending)           Assessment/Plan:    Active Hospital Problems    Diagnosis    Nephrolithiasis [N20.0]    Hypertension [I10]    Kidney stone [N20.0]    Morbid obesity ([address] [E66.01]    Acute cystitis [N30.00]     Right flank pain in setting of nephrolithiasis  -CT abdomen pelvis revealed: right hydronephrosis and right hydroureter with obstructing 4 MM distal right ureteral stone  -S/p right ureteral stent for infected stone-7/30  -Symptoms improved, urology is following input appreciated    Acute cystitis with hematuria  -Citrobacter koseri in the urine and blood,   On meropenem now, ID input pending    Marked leukocytosis-secondary to above, monitor with antibiotics and IV fluids, trending down    \ Essential HTN  -Hold lisinopril and hydrochlorothiazide secondary to MARISA, monitor blood pressure currently her blood pressure is lower limit of normal     Hypothyroidism  -continue levothyroxine     Morbid obesity  With Body mass index is 04.4 kg/m². Complicating assessment and treatment. Placing patient at risk for multiple co-morbidities as well as early death and contributing to the patient's presentation.  Counseled on weight loss     MARISA  Possibly obstructive versus prerenal-IV fluids, avoid nephrotoxic medications, intake and output urology on board, monitor BMP-improving       DVT Prophylaxis: Lovenox  Diet: DIET GENERAL;  Code Status: Full Code    PT/OT Eval Status:     Dispo -pending the course    Justin Metz MD

## 2020-08-01 LAB
BLOOD CULTURE, ROUTINE: ABNORMAL
BLOOD CULTURE, ROUTINE: ABNORMAL
CULTURE, BLOOD 2: ABNORMAL
ORGANISM: ABNORMAL

## 2020-08-01 PROCEDURE — 2580000003 HC RX 258: Performed by: INTERNAL MEDICINE

## 2020-08-01 PROCEDURE — 6370000000 HC RX 637 (ALT 250 FOR IP): Performed by: NURSE PRACTITIONER

## 2020-08-01 PROCEDURE — 6360000002 HC RX W HCPCS: Performed by: INTERNAL MEDICINE

## 2020-08-01 PROCEDURE — 1200000000 HC SEMI PRIVATE

## 2020-08-01 RX ADMIN — ENOXAPARIN SODIUM 30 MG: 40 INJECTION SUBCUTANEOUS at 10:40

## 2020-08-01 RX ADMIN — SODIUM CHLORIDE: 9 INJECTION, SOLUTION INTRAVENOUS at 08:56

## 2020-08-01 RX ADMIN — ACETAMINOPHEN 650 MG: 325 TABLET ORAL at 22:21

## 2020-08-01 RX ADMIN — ACETAMINOPHEN 650 MG: 325 TABLET ORAL at 10:45

## 2020-08-01 RX ADMIN — CALCITRIOL 0.5 MCG: 0.25 CAPSULE ORAL at 08:51

## 2020-08-01 RX ADMIN — SODIUM CHLORIDE: 9 INJECTION, SOLUTION INTRAVENOUS at 16:37

## 2020-08-01 RX ADMIN — LEVOTHYROXINE SODIUM 150 MCG: 0.15 TABLET ORAL at 08:51

## 2020-08-01 RX ADMIN — CEFTRIAXONE SODIUM 2 G: 2 INJECTION, POWDER, FOR SOLUTION INTRAMUSCULAR; INTRAVENOUS at 17:14

## 2020-08-01 ASSESSMENT — PAIN SCALES - GENERAL
PAINLEVEL_OUTOF10: 4
PAINLEVEL_OUTOF10: 3

## 2020-08-01 NOTE — PLAN OF CARE
Problem: Pain:  Goal: Pain level will decrease  Description: Pain level will decrease  Outcome: Ongoing  Note: Pt states pain is tolerable at this time. Pt is aware of pain medication regimen. Pt instructed to call out when pain medication is needed. Pt given info related to non pharmacological pain management options. Pt verbalized understanding. Will continue to assess and reassess pain.

## 2020-08-01 NOTE — PROGRESS NOTES
Hospitalist Progress Note      PCP: Waqar Prasad    Date of Admission: 7/29/2020    Chief Complaint: Right flank pain    Hospital Course:  60 y.o. female, with PMH of HTN and morbid obesity, who presented to [facility] with right flank pain. History obtained from the patient and review of EMR. The patient stated 3 to 4 days ago she urinated and noticed her urine was a different color. She stated at that time she was not too concerned, she just thought she did not drink enough water. The patient stated 2 days ago she did have some blood in her urine and again did not seem concerned. However, the patient stated yesterday she began to have some right flank pain that was associated with nausea. She stated this morning she was woken up to the right flank pain that radiated around to her right lower quadrant. The patient stated her pain was associated with nausea and vomiting. She stated she did have 3 episodes of vomiting and therefore called her PCP. The patient stated her PCP referred her to the urgent care and when she arrived in urgent care they told her she needed to go to the emergency room for a CT scan. In the emergency room the patient had a CT of her abdomen and pelvis that revealed right hydronephrosis and right hydroureter with obstructing 4 MM distal right ureteral stone. She was given an antiemetic and IV pain medication. Urology was consulted and the patient was made n.p.o. She was started on cefepime. The patient will be admitted for further evaluation. She denied any other associated symptoms as well as any aggravating and/or alleviating factors. At the time of this assessment, the patient was resting comfortably in bed. She currently denies any chest pain, back pain, abdominal pain, shortness of breath, numbness, tingling, N/V/C/D, fever and/or chills. Subjective:   Has some right flank episodic pain .   No nausea vomiting , fever or change in mental status at this time, has El and has hematuria      Medications:  Reviewed    Infusion Medications    sodium chloride 125 mL/hr at 08/01/20 0856     Scheduled Medications    enoxaparin  30 mg Subcutaneous Daily    cefTRIAXone (ROCEPHIN) IV  2 g Intravenous Q24H    calcitRIOL  0.5 mcg Oral Daily    levothyroxine  150 mcg Oral Daily    sodium chloride flush  10 mL Intravenous 2 times per day     PRN Meds: sodium chloride flush, acetaminophen **OR** acetaminophen, polyethylene glycol, promethazine **OR** ondansetron      Intake/Output Summary (Last 24 hours) at 8/1/2020 1539  Last data filed at 8/1/2020 1505  Gross per 24 hour   Intake 3726 ml   Output 1800 ml   Net 1926 ml       Physical Exam Performed:    BP (!) 155/82   Pulse 75   Temp 98.6 °F (37 °C) (Oral)   Resp 16   Ht 5' 6\" (1.676 m)   Wt (!) 347 lb 4 oz (157.5 kg)   LMP 11/24/2016   SpO2 95%   BMI 56.05 kg/m²     General appearance: No apparent distress, appears stated age and cooperative. Obese  HEENT: Pupils equal, round, and reactive to light. Conjunctivae/corneas clear. Neck: Supple, with full range of motion. No jugular venous distention. Trachea midline. Respiratory:  Normal respiratory effort. Clear to auscultation, bilaterally without Rales/Wheezes/Rhonchi. Cardiovascular: Regular rate and rhythm with normal S1/S2 without murmurs, rubs or gallops. Abdomen: Soft, non-tender, non-distended with normal bowel sounds. Musculoskeletal: No clubbing, cyanosis or edema bilaterally. Full range of motion without deformity. Skin: Skin color, texture, turgor normal.  No rashes or lesions. Neurologic:  Neurovascularly intact without any focal sensory/motor deficits.  Cranial nerves: II-XII intact, grossly non-focal.  Psychiatric: Alert and oriented, thought content appropriate, normal insight  Capillary Refill: Brisk,< 3 seconds   Peripheral Pulses: +2 palpable, equal bilaterally       Labs:   Recent Labs     07/30/20  0323 07/30/20  1640 07/31/20  0952   WBC 33.5* 44.4* 31.0*   HGB 13.2 12.8 12.5   HCT 40.9 39.3 38.6    144 130*     Recent Labs     07/29/20  1647 07/30/20  0323 07/31/20  0952    139 139   K 3.9 3.6 4.0    101 103   CO2 30 28 26   BUN 21* 25* 33*   CREATININE 1.0 1.8* 1.6*   CALCIUM 9.4 9.0 8.0*     Recent Labs     07/29/20  1647   AST 25   ALT 27   BILITOT 0.4   ALKPHOS 91     No results for input(s): INR in the last 72 hours. No results for input(s): Fritzold Hodgkins in the last 72 hours. Urinalysis:      Lab Results   Component Value Date    NITRU Negative 07/29/2020    WBCUA  07/29/2020    BACTERIA 2+ 07/29/2020    RBCUA 11-20 07/29/2020    BLOODU LARGE 07/29/2020    SPECGRAV >=1.030 07/29/2020    GLUCOSEU Negative 07/29/2020       Radiology:  XR ABDOMEN (KUB) (SINGLE AP VIEW)   Final Result   Right ureteral catheter is noted in normal position. Please refer to   procedure notes for additional details. CT ABDOMEN PELVIS W IV CONTRAST Additional Contrast? None   Final Result   Right hydronephrosis and right hydroureter with obstructing 4 mm distal right   ureteral stone.          FLUORO FOR SURGICAL PROCEDURES    (Results Pending)           Assessment/Plan:  Leukemoid reaction secondary to UTI with nephrolithiasis  Has stent in situ awaiting ureteroscopy in a few weeks and by urology  Check CBC in the morning  Continue ceftriaxone    Right flank pain in setting of nephrolithiasis  -CT abdomen pelvis revealed: right hydronephrosis and right hydroureter with obstructing 4 MM distal right ureteral stone  -S/p right ureteral stent for infected stone-7/30  -Symptoms improved, urology is following input appreciated    Acute cystitis with hematuria  -Citrobacter koseri in the urine and blood,   On meropenem now, ID input pending    Marked leukocytosis-secondary to above, monitor with antibiotics and IV fluids, trending down    Essential HTN  -Hold lisinopril and hydrochlorothiazide secondary to MARISA, monitor blood pressure currently her blood pressure is lower limit of normal     Hypothyroidism  -continue levothyroxine     Morbid obesity  With Body mass index is 77.8 kg/m². Complicating assessment and treatment. Placing patient at risk for multiple co-morbidities as well as early death and contributing to the patient's presentation.  Counseled on weight loss     MARISA  Possibly obstructive versus prerenal-IV fluids, avoid nephrotoxic medications, intake and output urology on board, monitor BMP-improving       DVT Prophylaxis: Lovenox  Diet: DIET GENERAL;  Code Status: Full Code    PT/OT Eval Status:     Dispo -pending the course    Jimenez Iverson MD

## 2020-08-01 NOTE — PROGRESS NOTES
Pt is alert and oriented. VSS. RA. Pt denies pain and discomfort at this time. Pt has a lebron with pattie urine. Shift assessment completed and documented. Call light within reach. Bed side table within reach. Wheels locked. Bed in lowest position. Pt instructed to call out for assistance. Pt expressesed understanding & calls out appropritately. All care per orders. Will continue to monitor.  Electronically signed by Charan Montes RN on 7/31/2020 at 10:30 PM

## 2020-08-01 NOTE — PROGRESS NOTES
Urology Attending Progress Note      Subjective: \"I don't feel as good today as yesterday\"    Vitals:  /86   Pulse 76   Temp 98.5 °F (36.9 °C) (Oral)   Resp 16   Ht 5' 6\" (1.676 m)   Wt (!) 347 lb 4 oz (157.5 kg)   LMP 2016   SpO2 94%   BMI 56.05 kg/m²   Temp  Av.3 °F (36.8 °C)  Min: 97.5 °F (36.4 °C)  Max: 98.7 °F (37.1 °C)    Intake/Output Summary (Last 24 hours) at 2020 1028  Last data filed at 2020 0850  Gross per 24 hour   Intake 4830.17 ml   Output 1525 ml   Net 3305.17 ml       Exam: abd soft, urine pink/red in lebron    Labs:  WBC:    Lab Results   Component Value Date    WBC 31.0 2020     Hemoglobin/Hematocrit:    Lab Results   Component Value Date    HGB 12.5 2020    HCT 38.6 2020     BMP:    Lab Results   Component Value Date     2020    K 4.0 2020    K 3.6 2020     2020    CO2 26 2020    BUN 33 2020    LABALBU 4.3 2020    CREATININE 1.6 2020    CALCIUM 8.0 2020    GFRAA 40 2020    GFRAA >60 2012    LABGLOM 33 2020     PT/INR:  No results found for: PROTIME, INR  PTT:  No results found for: APTT[APTT    Blood Culture:  Citrobacter, Enterobacter    Antibiotic Therapy:  Rocephin    Impression/Plan: UTI, ureteral stone  1. Will remove lebron  2. Stent in place and will need ureteroscopy in a few weeks  3.  WBC count improving    Lynette Davis MD

## 2020-08-02 VITALS
SYSTOLIC BLOOD PRESSURE: 144 MMHG | WEIGHT: 293 LBS | RESPIRATION RATE: 16 BRPM | BODY MASS INDEX: 47.09 KG/M2 | DIASTOLIC BLOOD PRESSURE: 76 MMHG | OXYGEN SATURATION: 96 % | TEMPERATURE: 98.7 F | HEART RATE: 73 BPM | HEIGHT: 66 IN

## 2020-08-02 LAB
A/G RATIO: 1.1 (ref 1.1–2.2)
ALBUMIN SERPL-MCNC: 3.3 G/DL (ref 3.4–5)
ALP BLD-CCNC: 73 U/L (ref 40–129)
ALT SERPL-CCNC: 15 U/L (ref 10–40)
ANION GAP SERPL CALCULATED.3IONS-SCNC: 10 MMOL/L (ref 3–16)
AST SERPL-CCNC: 14 U/L (ref 15–37)
BILIRUB SERPL-MCNC: 0.3 MG/DL (ref 0–1)
BUN BLDV-MCNC: 24 MG/DL (ref 7–20)
CALCIUM SERPL-MCNC: 7.7 MG/DL (ref 8.3–10.6)
CHLORIDE BLD-SCNC: 107 MMOL/L (ref 99–110)
CO2: 27 MMOL/L (ref 21–32)
CREAT SERPL-MCNC: 1.1 MG/DL (ref 0.6–1.1)
GFR AFRICAN AMERICAN: >60
GFR NON-AFRICAN AMERICAN: 51
GLOBULIN: 3 G/DL
GLUCOSE BLD-MCNC: 97 MG/DL (ref 70–99)
HCT VFR BLD CALC: 37.5 % (ref 36–48)
HEMOGLOBIN: 12 G/DL (ref 12–16)
MCH RBC QN AUTO: 28.9 PG (ref 26–34)
MCHC RBC AUTO-ENTMCNC: 32 G/DL (ref 31–36)
MCV RBC AUTO: 90.3 FL (ref 80–100)
PDW BLD-RTO: 15 % (ref 12.4–15.4)
PLATELET # BLD: 130 K/UL (ref 135–450)
PMV BLD AUTO: 12.3 FL (ref 5–10.5)
POTASSIUM SERPL-SCNC: 4.2 MMOL/L (ref 3.5–5.1)
RBC # BLD: 4.16 M/UL (ref 4–5.2)
SODIUM BLD-SCNC: 144 MMOL/L (ref 136–145)
TOTAL PROTEIN: 6.3 G/DL (ref 6.4–8.2)
WBC # BLD: 15.8 K/UL (ref 4–11)

## 2020-08-02 PROCEDURE — 6370000000 HC RX 637 (ALT 250 FOR IP): Performed by: NURSE PRACTITIONER

## 2020-08-02 PROCEDURE — 85027 COMPLETE CBC AUTOMATED: CPT

## 2020-08-02 PROCEDURE — 2580000003 HC RX 258: Performed by: INTERNAL MEDICINE

## 2020-08-02 PROCEDURE — 80053 COMPREHEN METABOLIC PANEL: CPT

## 2020-08-02 PROCEDURE — 6360000002 HC RX W HCPCS: Performed by: INTERNAL MEDICINE

## 2020-08-02 PROCEDURE — 36415 COLL VENOUS BLD VENIPUNCTURE: CPT

## 2020-08-02 RX ORDER — CEFUROXIME AXETIL 500 MG/1
250 TABLET ORAL 2 TIMES DAILY
Qty: 14 TABLET | Refills: 0 | Status: SHIPPED | OUTPATIENT
Start: 2020-08-02 | End: 2020-08-09

## 2020-08-02 RX ORDER — CEFUROXIME AXETIL 250 MG/1
250 TABLET ORAL 2 TIMES DAILY
Qty: 14 TABLET | Refills: 0 | Status: SHIPPED | OUTPATIENT
Start: 2020-08-02 | End: 2020-08-09

## 2020-08-02 RX ORDER — POLYETHYLENE GLYCOL 3350 17 G/17G
17 POWDER, FOR SOLUTION ORAL DAILY PRN
Qty: 527 G | Refills: 1 | Status: SHIPPED | OUTPATIENT
Start: 2020-08-02 | End: 2020-09-01

## 2020-08-02 RX ORDER — POLYETHYLENE GLYCOL 3350 17 G/17G
17 POWDER, FOR SOLUTION ORAL DAILY
Qty: 1530 G | Refills: 1 | Status: SHIPPED | OUTPATIENT
Start: 2020-08-02 | End: 2020-09-01

## 2020-08-02 RX ORDER — CIPROFLOXACIN 500 MG/1
500 TABLET, FILM COATED ORAL 2 TIMES DAILY
Qty: 14 TABLET | Refills: 0 | Status: SHIPPED | OUTPATIENT
Start: 2020-08-02 | End: 2020-08-09

## 2020-08-02 RX ADMIN — ENOXAPARIN SODIUM 30 MG: 40 INJECTION SUBCUTANEOUS at 07:35

## 2020-08-02 RX ADMIN — CALCITRIOL 0.5 MCG: 0.25 CAPSULE ORAL at 07:35

## 2020-08-02 RX ADMIN — LEVOTHYROXINE SODIUM 150 MCG: 0.15 TABLET ORAL at 07:34

## 2020-08-02 RX ADMIN — SODIUM CHLORIDE: 9 INJECTION, SOLUTION INTRAVENOUS at 00:42

## 2020-08-02 RX ADMIN — SODIUM CHLORIDE: 9 INJECTION, SOLUTION INTRAVENOUS at 07:40

## 2020-08-02 NOTE — PROGRESS NOTES
Patient given discharge instructions, verbalized understanding. Patient to follow up with urology. IV/ tele removed. Patient taken by wheelchair to vehicle.

## 2020-08-02 NOTE — PROGRESS NOTES
Pt is alert and oriented. VSS. RA. Pt c/o 4/10 pain. Pt given PRN pain medication per MAR. Pt urine cherry and yellow. Shift assessment completed and documented. Call light within reach. Bed side table within reach. Wheels locked. Bed in lowest position. Bed check in place. Pt instructed to call out for assistance. Pt expressesed understanding & calls out appropritately. All care per orders. Will continue to monitor.  Electronically signed by Pranav Singh RN on 8/2/2020 at 12:35 AM

## 2020-08-02 NOTE — DISCHARGE SUMMARY
Hospital Medicine Discharge Summary    Patient: Lisandra Orozco     Gender: female  : 1965   Age: 60 y.o. MRN: [id number]    Admitting Physician: Jennifer De Oliveira MD  Discharge Physician: Malena Hammond MD     Code Status: Full Code     Admit Date: 2020   Discharge Date:   20    Disposition:  DC home    Discharge Diagnoses:    1. Resolving Leukemoid reaction secondary to Citrobacter koseri UTI with nephrolithiasis  Has stent in situ awaiting ureteroscopy in a few weeks and by urology     2. Right flank pain in setting of nephrolithiasis- resolved     3. Acute cystitis with hematuria      Follow-up appointments: As arranged by patient with urology  Outpatient to do list: None    Condition at Discharge: Stable    Hospital Course:   Jennifer Lopez is a 60 y. o. female, with PMH of HTN and morbid obesity who was admitted for right flank pain of 3 to 4 days duration associated with hematuria. she then developed nausea and nonbloody , nonbilious vomiting. the right flank pain was radiating to her right groin  She also had some fevers and chills. Was seen in the emergency room. She had features of sepsis. A CT of the abdomen and pelvis was done showing right hydronephrosis and right hydroureter with obstructing 4 MM distal right ureteral stone.  She was given an antiemetic and IV pain medication.  Urology was consulted and the patient was made n.p.o.  She was started on cefepime.  She has a stent in place and urology will see her as an outpatient for a cystoureteroscopy.     Discharge Medications:   Current Discharge Medication List      START taking these medications    Details   polyethylene glycol (GLYCOLAX) 17 g packet Take 17 g by mouth daily as needed for Constipation  Qty: 527 g, Refills: 1      cefUROXime (CEFTIN) 500 MG tablet Take 0.5 tablets by mouth 2 times daily for 7 days  Qty: 14 tablet, Refills: 0           Current Discharge Medication List        Current Discharge Medication List      CONTINUE these medications which have NOT CHANGED    Details   levothyroxine (SYNTHROID) 150 MCG tablet TAKE 1 TABLET BY MOUTH ONCE DAILY      Calcium Carbonate-Vitamin D 500-400 MG-UNIT TABS Take 600 mg by mouth 2 times daily      albuterol sulfate  (90 Base) MCG/ACT inhaler Inhale 2 puffs into the lungs every 4 hours as needed      lisinopril-hydrochlorothiazide (PRINZIDE;ZESTORETIC) 10-12.5 MG per tablet Take by mouth      calcitRIOL (ROCALTROL) 0.5 MCG capsule Take 0.5 mcg by mouth daily. Current Discharge Medication List          Discharge ROS:  A complete review of systems was asked and negative except for above    Discharge Exam:    General appearance: No apparent distress, appears stated age and cooperative. Obese  HEENT: Pupils equal, round, and reactive to light. Conjunctivae/corneas clear. Neck: Supple, with full range of motion. No jugular venous distention. Trachea midline. Respiratory:  Normal respiratory effort. Clear to auscultation, bilaterally without Rales/Wheezes/Rhonchi. Cardiovascular: Regular rate and rhythm with normal S1/S2 without murmurs, rubs or gallops. Abdomen: Soft, non-tender, non-distended with normal bowel sounds. Musculoskeletal: No clubbing, cyanosis or edema bilaterally. Full range of motion without deformity. Skin: Skin color, texture, turgor normal.  No rashes or lesions. Neurologic:  Neurovascularly intact without any focal sensory/motor deficits. Cranial nerves: II-XII intact, grossly non-focal.  Psychiatric: Alert and oriented, thought content appropriate, normal insight  Capillary Refill: Brisk,< 3 seconds   Peripheral Pulses: +2 palpable, equal bilaterally     Labs:  For convenience and continuity at follow-up the following most recent labs are provided:    Lab Results   Component Value Date    WBC 15.8 08/02/2020    HGB 12.0 08/02/2020    HCT 37.5 08/02/2020    MCV 90.3 08/02/2020     08/02/2020     08/02/2020    K 4.2 08/02/2020    K 3.6 07/30/2020     08/02/2020    CO2 27 08/02/2020    BUN 24 08/02/2020    CREATININE 1.1 08/02/2020    CALCIUM 7.7 08/02/2020    ALKPHOS 73 08/02/2020    ALT 15 08/02/2020    AST 14 08/02/2020    BILITOT 0.3 08/02/2020    LABALBU 3.3 08/02/2020     No results found for: INR    Radiology:  Xr Abdomen (kub) (single Ap View)    Result Date: 7/30/2020  EXAMINATION: FLUOROSCOPY) OF THE ABDOMEN 7/30/2020 2:05 pm COMPARISON: None. HISTORY: ORDERING SYSTEM PROVIDED HISTORY: pain TECHNOLOGIST PROVIDED HISTORY: Reason for exam:->pain FLUOROSCOPY TIME: 0.1 minute 1 image FINDINGS: A right ureteral catheter projects over the right kidney. Bowel gas pattern is normal.     Right ureteral catheter is noted in normal position. Please refer to procedure notes for additional details. Ct Abdomen Pelvis W Iv Contrast Additional Contrast? None    Result Date: 7/29/2020  EXAMINATION: CT OF THE ABDOMEN AND PELVIS WITH CONTRAST 7/29/2020 7:51 pm TECHNIQUE: CT of the abdomen and pelvis was performed with the administration of intravenous contrast. Multiplanar reformatted images are provided for review. Dose modulation, iterative reconstruction, and/or weight based adjustment of the mA/kV was utilized to reduce the radiation dose to as low as reasonably achievable. COMPARISON: 02/08/2008 HISTORY: ORDERING SYSTEM PROVIDED HISTORY: R sided pain, infected ua, appy vs pyelo vs stone vs GB? TECHNOLOGIST PROVIDED HISTORY: If patient is on cardiac monitor and/or pulse ox, they may be taken off cardiac monitor and pulse ox, left on O2 if currently on. All monitors reattached when patient returns to room. Additional Contrast?->None Reason for exam:->R sided pain, infected ua, appy vs pyelo vs stone vs GB? Reason for Exam: RT flank pain x 2 days Acuity: Acute Type of Exam: Initial Additional signs and symptoms: vomiting today,hematuria FINDINGS: Lower Chest: Left lower lobe scarring.  Organs: Again identified is a hypodense 3.6 x 3.1 cm left hepatic lobe lesion with peripheral nodular enhancement most compatible with benign hemangioma. The spleen, pancreas, gallbladder, adrenal glands, and left kidneys show no acute abnormality. There is right perinephric and periureteral stranding as well as mild right hydronephrosis with a distal right ureteral obstructing 4 mm. There is also delayed excretion contrast of the right kidney. There is a distal right ureteral 4 mm stone. No other calcified urinary collecting system stones suspected. There is a benign 4.2 cm right renal cortical cyst again identified. GI/Bowel: Small hiatal hernia suspected. The bowel otherwise shows normal caliber and course without suspected wall thickening. Normal appendix. There are scattered colonic diverticula, most numerous of the sigmoid colon, without evidence of active inflammation. Pelvis: A lobulated contour of the uterus is suggestive of fibroids. The urinary bladder is mostly collapsed. Peritoneum/Retroperitoneum: The aorta is normal in caliber and course, showing calcifications. Bones/Soft Tissues: There is mild 4 mm L4-5 degenerative anterolisthesis. No acute bony abnormality. No free fluid or adenopathy. Right hydronephrosis and right hydroureter with obstructing 4 mm distal right ureteral stone. EKG     Rhythm: normal sinus   Rate: normal  Clinical Impression: no acute changes        The patient was seen and examined on day of discharge and this discharge summary is in conjunction with any daily progress note from day of discharge. Time Spent on discharge is 30 minutes  in the examination, evaluation, counseling and review of medications and discharge plan.       Note that more than 30 minutes was spent in preparing discharge papers, discussing discharge with patient, medication review, etc.       Signed:    Jennifer Gorman MD   8/2/2020      Thank you Bere Phelps for the opportunity to be involved in this patient's

## 2020-08-02 NOTE — PLAN OF CARE
Problem: Pain:  Goal: Pain level will decrease  Description: Pain level will decrease  Outcome: Ongoing  Note: Pt rates pain 4/10. Pt given PRN pain medication per MAR. Pt is aware of pain medication regimen. Pt instructed to call out when pain medication is needed. Pt given info related to non pharmacological pain management options. Pt verbalized understanding. Will continue to assess and reassess pain.

## 2020-08-02 NOTE — PROGRESS NOTES
Shift assessment updated as charted. Patient a/ox4. VSS. Patient voiding without difficulty post lebron removal. Patient denies pain. Will monitor.

## 2020-08-10 NOTE — PROGRESS NOTES
Physician Progress Note      PATIENT:               Bryce Daniels  [facility] #:                  [id number]  :                       1965  ADMIT DATE:       2020 6:47 PM  [address] [place] DATE:        2020 2:55 PM  RESPONDING  PROVIDER #:        Nena Cook MD          QUERY TEXT:    Dear  Attending Physician,    Patient admitted with Pyonephrosis. Pt noted to have elevated WBC,   tachycardia, fever, tachypnea and lactic acidosis. If possible, please   document in the progress notes and discharge summary if you are evaluating and   /or treating any of the following: The medical record reflects the following:  Risk Factors: Pyonephrosis, +Citrobacter koseri in blood and urine  Clinical Indicators: WBC 44.4, lactic acid 3.6, HR , RR 18-26  Treatment: IV Merrem, serial labs, ID consult, IVF    Thank you,  Levon Moreira RN, CDS  [phone]  Options provided:  -- Sepsis, present on arrival  -- Sepsis, not present on admission,  -- No Sepsis, localized infection only *** (if known)  -- Sepsis was ruled out  -- Other - I will add my own diagnosis  -- Disagree - Not applicable / Not valid  -- Disagree - Clinically unable to determine / Unknown  -- Refer to Clinical Documentation Reviewer    PROVIDER RESPONSE TEXT:    This patient has sepsis which was present on arrival.    Query created by: Charline Mccarty on 2020 3:32 PM      QUERY TEXT:    Pt admitted with Pyonephrosis. Pt noted to have MARISA. If possible, please   document in the progress notes and discharge summary if you are evaluating   and/or treating any of the following: The medical record reflects the following:  Risk Factors: pyonephrosis  Clinical Indicators:  MARISA  Treatment: Cysto with stent, IV fluids, avoid nephrotoxic medications, serial   labs    Thank you,  Levon Moreira RN, CDS  [phone]  Options provided:  -- Acute kidney failure with acute tubular necrosis  -- Acute kidney failure  -- Acute kidney injury  -- Other - I will add my own diagnosis  -- Disagree - Not applicable / Not valid  -- Disagree - Clinically unable to determine / Unknown  -- Refer to Clinical Documentation Reviewer    PROVIDER RESPONSE TEXT:    MARISA due to obstructive uropathy    Query created by: Ruben Mujica on 7/31/2020 3:25 PM      Electronically signed by:  Jessica Banks MD 8/10/2020 1:37 PM

## 2022-04-28 ENCOUNTER — OFFICE VISIT (OUTPATIENT)
Dept: ORTHOPEDIC SURGERY | Age: 57
End: 2022-04-28
Payer: COMMERCIAL

## 2022-04-28 VITALS — WEIGHT: 293 LBS | BODY MASS INDEX: 47.09 KG/M2 | HEIGHT: 66 IN

## 2022-04-28 DIAGNOSIS — M17.11 PRIMARY OSTEOARTHRITIS OF RIGHT KNEE: Primary | ICD-10-CM

## 2022-04-28 DIAGNOSIS — M17.12 PRIMARY OSTEOARTHRITIS OF LEFT KNEE: ICD-10-CM

## 2022-04-28 DIAGNOSIS — M25.561 CHRONIC PAIN OF RIGHT KNEE: ICD-10-CM

## 2022-04-28 DIAGNOSIS — M21.162 ACQUIRED VARUS DEFORMITY KNEE, LEFT: ICD-10-CM

## 2022-04-28 DIAGNOSIS — G89.29 CHRONIC PAIN OF RIGHT KNEE: ICD-10-CM

## 2022-04-28 DIAGNOSIS — E66.01 CLASS 3 SEVERE OBESITY DUE TO EXCESS CALORIES WITH BODY MASS INDEX (BMI) OF 50.0 TO 59.9 IN ADULT, UNSPECIFIED WHETHER SERIOUS COMORBIDITY PRESENT (HCC): ICD-10-CM

## 2022-04-28 DIAGNOSIS — G89.29 CHRONIC PAIN OF LEFT KNEE: ICD-10-CM

## 2022-04-28 DIAGNOSIS — M25.562 CHRONIC PAIN OF LEFT KNEE: ICD-10-CM

## 2022-04-28 DIAGNOSIS — M21.161 ACQUIRED VARUS DEFORMITY KNEE, RIGHT: ICD-10-CM

## 2022-04-28 PROCEDURE — 20610 DRAIN/INJ JOINT/BURSA W/O US: CPT | Performed by: PHYSICIAN ASSISTANT

## 2022-04-28 PROCEDURE — 99203 OFFICE O/P NEW LOW 30 MIN: CPT | Performed by: PHYSICIAN ASSISTANT

## 2022-04-28 RX ORDER — FLUTICASONE PROPIONATE AND SALMETEROL XINAFOATE 115; 21 UG/1; UG/1
AEROSOL, METERED RESPIRATORY (INHALATION)
COMMUNITY
Start: 2022-01-26

## 2022-04-29 NOTE — PROGRESS NOTES
12 [facility]  History and Physical      Date:  2022    Name:  Melisa Ascencio  Address:  [address] [place] [address] Dr Blaine Moritz [address]    :  1965      Age:   60 y.o. Chief Complaint    New Patient (NP RIGHT KNEE)      History of Present Illness:  Melisa Ascencio is a 60 y.o. female who presents for evaluation of her bilateral knee pain. This patient states that she has had bilateral knee pain for many years, right worse than left. She describes the pain as dull and achy with sharp shooting pain on occasion. She has been required to ambulate with a cane because of the pain. She also notes stiffness when she gets up from a seated position after being sedentary for some time. She experiences occasional mechanical symptoms such as catching and pseudolocking. She denies any giving way. She has been utilizing conservative treatment including: Rest, ice, activity modification and over-the-counter anti-inflammatories. The patient denies any new injury. The patient denies any giving way, joint locking, and numbness.            Past Medical History:   Diagnosis Date    Cancer [facility])     thyroid    Hypertension     Thyroid disease     S/P thyroidectomy due to cancer        Past Surgical History:   Procedure Laterality Date    CYSTOSCOPY Right 2020    CYSTOSCOPY, RIGHT STENT PLACEMENT performed by Sam Duvall MD at [facility]  10/8/2013    hysteroscopy  endometrial  ablation    KNEE ARTHROSCOPY Left     THYROIDECTOMY, COMPLETION      TONSILLECTOMY         Family History   Problem Relation Age of Onset    Heart Disease Father 48        MI       Social History     Socioeconomic History    Marital status:      Spouse name: None    Number of children: None    Years of education: None    Highest education level: None   Occupational History    None   Tobacco Use    Smoking status: Former Smoker     Quit date: 10/7/1995     Years since quittin.5    Smokeless tobacco: Never Used   Substance and Sexual Activity    Alcohol use: No    Drug use: No    Sexual activity: None   Other Topics Concern    None   Social History Narrative    None     Social Determinants of Health     Financial Resource Strain:     Difficulty of Paying Living Expenses: Not on file   Food Insecurity:     Worried About Running Out of Food in the Last Year: Not on file    Deandre of Food in the Last Year: Not on file   Transportation Needs:     Lack of Transportation (Medical): Not on file    Lack of Transportation (Non-Medical):  Not on file   Physical Activity:     Days of Exercise per Week: Not on file    Minutes of Exercise per Session: Not on file   Stress:     Feeling of Stress : Not on file   Social Connections:     Frequency of Communication with Friends and Family: Not on file    Frequency of Social Gatherings with Friends and Family: Not on file    Attends Rastafari Services: Not on file    Active Member of [address] [place] [address] or Organizations: Not on file    Attends Club or Organization Meetings: Not on file    Marital Status: Not on file   Intimate Partner Violence:     Fear of Current or Ex-Partner: Not on file    Emotionally Abused: Not on file    Physically Abused: Not on file    Sexually Abused: Not on file   Housing Stability:     Unable to Pay for Housing in the Last Year: Not on file    Number of [place] in the Last Year: Not on file    Unstable Housing in the Last Year: Not on file       Current Outpatient Medications   Medication Sig Dispense Refill    fluticasone-salmeterol (ADVAIR HFA) 115-21 MCG/ACT inhaler Inhale 2 puffs into the lungs every 12 hours      ADVAIR -21 MCG/ACT inhaler INHALE 2 PUFFS BY MOUTH EVERY 12 HOURS      levothyroxine (SYNTHROID) 150 MCG tablet TAKE 1 TABLET BY MOUTH ONCE DAILY      Calcium Carbonate-Vitamin D 500-400 MG-UNIT TABS Take 600 mg by mouth 2 times daily      albuterol sulfate  (90 Base) MCG/ACT inhaler Inhale 2 puffs into the lungs every 4 hours as needed      lisinopril-hydrochlorothiazide (PRINZIDE;ZESTORETIC) 10-12.5 MG per tablet Take by mouth      calcitRIOL (ROCALTROL) 0.5 MCG capsule Take 0.5 mcg by mouth daily. No current facility-administered medications for this visit. Allergies   Allergen Reactions    Penicillins Hives         Review of Systems:  A 14 point review of systems was completed by the patient and is available in the media section of the scanned medical record and was reviewed. Vital Signs:   Ht 5' 6\" (1.676 m)   Wt (!) 347 lb (157.4 kg)   LMP 11/24/2016   BMI 56.01 kg/m²     General Exam:    General: Aleena Velarde is a healthy and well appearing 60y.o. year old female who is sitting comfortably in our office in no acute distress. Neuro: Alert & Oriented x 3,  normal,  no focal deficits noted. Normal mood & affect. Eyes: Sclera clear  Ears: Normal external ear  Mouth: Patient wearing a mask  Pulm: Respirations unlabored and regular   Skin: Warm, well perfused      Knee Examination: Right    Inspection: Soft tissue swelling observed. No effusion, ecchymosis, discoloration, erythema, excessive warmth. no gross deformities, no signs of infection. Palpation: There is patellofemoral crepitus, there is medial and lateral joint line tenderness. No other osseous or soft tissue tenderness around the knee. Negative calf tenderness    Range of Motion:  0-120 degrees     Strength: Grossly intact    Special Tests: Valgus and varus stress test are stable at 0 and 30°. Lachman's exhibits a solid endpoint. Negative posterior drawer. Negative Homans sign    Gait: Antalgic, with the use of a cane    Alignment: Varus deformity    Neuro: Sensation equal bilateral lower extremities    Vascular: 1+ posterior tibialis pulse      Contralateral Knee Comparison Examination: Left    Inspection: Soft tissue swelling observed.   No effusion, ecchymosis, discoloration, erythema, excessive warmth. no gross deformities, no signs of infection. Palpation: There is patellofemoral crepitus, there is medial and lateral joint line tenderness. No other osseous or soft tissue tenderness around the knee. Negative calf tenderness    Range of Motion:  0-120 degrees     Strength: Grossly intact    Special Tests: Valgus and varus stress test are stable at 0 and 30°. Lachman's exhibits a solid endpoint. Negative posterior drawer. Negative Homans sign    Gait: Antalgic, with the use of a cane    Alignment: Varus deformity    Neuro: Sensation equal bilateral lower extremities    Vascular: 1+ posterior tibialis pulse      Radiology:   Previously obtain imaging reviewed. X-rays obtained and reviewed in office: AP and merchant view of both knees and a lateral of the right. Impression: No fractures, dislocations, visible tumors, or signs of acute trauma. Patient exhibits end-stage, bone-on-bone, osteoarthritis in the in both knees. KL grade 4. Osteophytes throughout. Office Procedures:  Orders Placed This Encounter   Procedures    XR KNEE RIGHT (3 VIEWS)     Standing Status:   Future     Number of Occurrences:   1     Standing Expiration Date:   4/28/2023     Order Specific Question:   Reason for exam:     Answer:   right knee pain      After informed consent was provided, the patient was seated on the exam table with the right knee flexed to 90 degrees. The anterolateral aspect of the knee adjacent to the joint line was prepped with Chlora-prep. The skin and subcutaneous tissues were anesthetized with ethyl chloride spray. A 22-gauge needle was inserted into the right knee and 2 ml of 40 mg/ml DepoMedrol was injected. The needle was withdrawn and the puncture site sealed with a Band-Aid. The patient tolerated the procedure well. Post injection instructions and precautions given and any problems to notify us.     After informed consent was provided, the patient was seated on the exam table with the left knee flexed to 90 degrees. The anterolateral aspect of the knee adjacent to the joint line was prepped with Chlora-prep. The skin and subcutaneous tissues were anesthetized with ethyl chloride spray. A 22-gauge needle was inserted into the left knee and 2 ml of 40 mg/ml DepoMedrol was injected. The needle was withdrawn and the puncture site sealed with a Band-Aid. The patient tolerated the procedure well. Post injection instructions and precautions given and any problems to notify us. Assessment: Patient is a 60 y.o. female presenting to the office for an evaluation of her bilateral knee pain, right worse than left. This patient has a diagnosis of end-stage osteoarthritis being treated with conservative therapy at this time. Encounter Diagnoses   Name Primary?  Primary osteoarthritis of right knee Yes    Primary osteoarthritis of left knee     Acquired varus deformity knee, left     Acquired varus deformity knee, right     Chronic pain of right knee     Chronic pain of left knee     Class 3 severe obesity due to excess calories with body mass index (BMI) of 50.0 to 59.9 in adult, unspecified whether serious comorbidity present ([facility] [address]        No orders of the defined types were placed in this encounter. Medical decision making:  Patient's workup and evaluation were reviewed with the patient in the office today. Imaging reviewed with the patient. Given the patient's history and physical exam I believe we should attempt conservative treatment for the patient's condition. She has not had any injections in the past but was given a corticosteroid injection of both knees today. Conservative treatment for her condition was thoroughly discussed. Risks and benefits of conservative versus surgical treatment were reviewed.   She will ultimately need a total knee replacement in the future but she was educated on the optimization prior to surgery. We will attempt to utilize corticosteroid injections, viscosupplementation injections, and BMAC. All the patient's questions were answered while in the clinic. The patient is understanding of all instructions and agrees with the plan. Treatment Plan:    1. Observe postinjection precautions  2. Utilize cane to ambulate with weightbearing as tolerated  3. Activity modification/Rest   4. Ice 20 minutes ever 1-2 hours PRN  5. Take medications as prescribed/instructed  6. Elevation   7. Lightweight exercise/low impact exercise  8. Appropriate diet/weight management      Follow up: As needed    This patient exhibits moderate complexity for obtaining an independent history, reviewing past medical records, independent interpretation/review of diagnostic imaging, and further coordinating care. The patient exhibits low risk given that the patient is being treated with conservative therapy. Approximately 30 minutes were spent reviewing past medical records, imaging, educating the patient, and coordinating care. Guido Felder PA-C    Physician Assistant - Certified    53/41/53 9:13 AM    During this examination, IJonnie [place], functioned as a scribe for Dr. Yady Balbuena. This dictation was performed with a verbal recognition program (DRAGON) and it was checked for errors. It is possible that there are still dictated errors within this office note. If so, please bring any errors to my attention for an addendum. All efforts were made to ensure that this office note is accurate.     Yady Balbuena MD  [facility]

## 2022-06-09 ENCOUNTER — OFFICE VISIT (OUTPATIENT)
Dept: ORTHOPEDIC SURGERY | Age: 57
End: 2022-06-09
Payer: COMMERCIAL

## 2022-06-09 VITALS — WEIGHT: 293 LBS | BODY MASS INDEX: 47.09 KG/M2 | HEIGHT: 66 IN

## 2022-06-09 DIAGNOSIS — G89.29 CHRONIC PAIN OF RIGHT KNEE: ICD-10-CM

## 2022-06-09 DIAGNOSIS — M17.11 PRIMARY OSTEOARTHRITIS OF RIGHT KNEE: Primary | ICD-10-CM

## 2022-06-09 DIAGNOSIS — M25.561 CHRONIC PAIN OF RIGHT KNEE: ICD-10-CM

## 2022-06-09 PROCEDURE — 99213 OFFICE O/P EST LOW 20 MIN: CPT | Performed by: ORTHOPAEDIC SURGERY

## 2022-06-11 NOTE — PROGRESS NOTES
12 [facility]  History and Physical      Date:  2022    Name:  Lashawn Walker  Address:  [address] [place] [address] Dr Zara Garcia [address]    :  1965      Age:   59 y.o. Chief Complaint    Knee Pain (right knee pain)      History of Present Illness:  Lashawn Walker is a 59 y.o. female who presents for follow-up evaluation of her right knee pain. The patient states that her pain is gotten worse since her last visit. She currently rates it at 6/10 achy, throbbing and intermittently sharp. It is worse with bending and walking. She has attempted conservative treatment including: rest, ice, elevation, bracing, physical therapy, home exercises, activity modification, NSAIDs as needed, corticosteroid injections. The patient does get some relief from cortisone but she notes that she has been exhibiting less relief over time. The patient denies any new injury. The patient denies any catching, giving way, joint locking, numbness, paresthesias, or weakness. History from the patient's last visit on 2022. Lashawn Walker is a 60 y.o. female who presents for evaluation of her bilateral knee pain. This patient states that she has had bilateral knee pain for many years, right worse than left. She describes the pain as dull and achy with sharp shooting pain on occasion. She has been required to ambulate with a cane because of the pain. She also notes stiffness when she gets up from a seated position after being sedentary for some time. She experiences occasional mechanical symptoms such as catching and pseudolocking. She denies any giving way. She has been utilizing conservative treatment including: Rest, ice, activity modification and over-the-counter anti-inflammatories. The patient denies any new injury. The patient denies any giving way, joint locking, and numbness.       Pain Assessment  Location of Pain: Knee  Location Modifiers: Right  Severity of Pain: 6  Quality of Pain: Sharp,Aching,Throbbing,Popping,Grinding  Duration of Pain: A few minutes  Frequency of Pain: Intermittent  Aggravating Factors: Bending,Walking  Limiting Behavior: Some  Relieving Factors: Rest,Nsaids  Result of Injury: No  Work-Related Injury: No  Are there other pain locations you wish to document?: No    Past Medical History:   Diagnosis Date    Cancer ([facility] [address]     thyroid    Hypertension     Thyroid disease     S/P thyroidectomy due to cancer        Past Surgical History:   Procedure Laterality Date    CYSTOSCOPY Right 2020    CYSTOSCOPY, RIGHT STENT PLACEMENT performed by Bo Hernandez MD at [facility]  10/8/2013    hysteroscopy  endometrial  ablation    KNEE ARTHROSCOPY Left     THYROIDECTOMY, COMPLETION      TONSILLECTOMY         Family History   Problem Relation Age of Onset    Heart Disease Father 48        MI       Social History     Socioeconomic History    Marital status:      Spouse name: None    Number of children: None    Years of education: None    Highest education level: None   Occupational History    None   Tobacco Use    Smoking status: Former Smoker     Quit date: 10/7/1995     Years since quittin.6    Smokeless tobacco: Never Used   Substance and Sexual Activity    Alcohol use: No    Drug use: No    Sexual activity: None   Other Topics Concern    None   Social History Narrative    None     Social Determinants of Health     Financial Resource Strain:     Difficulty of Paying Living Expenses: Not on file   Food Insecurity:     Worried About Running Out of Food in the Last Year: Not on file    Deandre of Food in the Last Year: Not on file   Transportation Needs:     Lack of Transportation (Medical): Not on file    Lack of Transportation (Non-Medical):  Not on file   Physical Activity:     Days of Exercise per Week: Not on file    Minutes of Exercise per Session: Not on file   Stress:     Feeling of Stress : Not on file   Social Connections:     Frequency of Communication with Friends and Family: Not on file    Frequency of Social Gatherings with Friends and Family: Not on file    Attends Congregation Services: Not on file    Active Member of Clubs or Organizations: Not on file    Attends Club or Organization Meetings: Not on file    Marital Status: Not on file   Intimate Partner Violence:     Fear of Current or Ex-Partner: Not on file    Emotionally Abused: Not on file    Physically Abused: Not on file    Sexually Abused: Not on file   Housing Stability:     Unable to Pay for Housing in the Last Year: Not on file    Number of [place] in the Last Year: Not on file    Unstable Housing in the Last Year: Not on file       Current Outpatient Medications   Medication Sig Dispense Refill    fluticasone-salmeterol (ADVAIR HFA) 115-21 MCG/ACT inhaler Inhale 2 puffs into the lungs every 12 hours      ADVAIR -21 MCG/ACT inhaler INHALE 2 PUFFS BY MOUTH EVERY 12 HOURS      levothyroxine (SYNTHROID) 150 MCG tablet TAKE 1 TABLET BY MOUTH ONCE DAILY      Calcium Carbonate-Vitamin D 500-400 MG-UNIT TABS Take 600 mg by mouth 2 times daily      albuterol sulfate  (90 Base) MCG/ACT inhaler Inhale 2 puffs into the lungs every 4 hours as needed      lisinopril-hydrochlorothiazide (PRINZIDE;ZESTORETIC) 10-12.5 MG per tablet Take by mouth      calcitRIOL (ROCALTROL) 0.5 MCG capsule Take 0.5 mcg by mouth daily. No current facility-administered medications for this visit. Allergies   Allergen Reactions    Penicillins Hives         Review of Systems:  A 14 point review of systems was completed by the patient and is available in the media section of the scanned medical record and was reviewed.         Vital Signs:   Ht 5' 6\" (1.676 m)   Wt (!) 350 lb (158.8 kg)   LMP 11/24/2016   BMI 56.49 kg/m²     General Exam:    General: Melisa Ascencio is a healthy and well appearing 59 y.o. year old female who is sitting comfortably in our office in no acute distress. Neuro: Alert & Oriented x 3,  normal,  no focal deficits noted. Normal mood & affect. Eyes: Sclera clear  Ears: Normal external ear  Mouth: Patient wearing a mask  Pulm: Respirations unlabored and regular   Skin: Warm, well perfused      Knee Examination: Right     Inspection: Soft tissue swelling observed. No effusion, ecchymosis, discoloration, erythema, excessive warmth. no gross deformities, no signs of infection.      Palpation: There is patellofemoral crepitus, there is medial and lateral joint line tenderness. No other osseous or soft tissue tenderness around the knee. Negative calf tenderness     Range of Motion: Grossly intact     Strength: Grossly intact     Special Tests: Valgus and varus stress test are stable at 0 and 30°. Lachman's exhibits a solid endpoint. Negative posterior drawer. Negative Homans sign     Gait: Antalgic, with the use of a cane     Alignment: Varus deformity     Neuro: Sensation equal bilateral lower extremities     Vascular: 1+ posterior tibialis pulse             Radiology:   Previously obtain imaging reviewed. No new images obtained. Assessment: Patient is a 59 y.o. female presenting to the office for follow-up evaluation of her right knee pain. This patient is currently being treated with conservative therapy for the osteoarthritis in her right knee. Encounter Diagnoses   Name Primary?  Primary osteoarthritis of right knee Yes    Chronic pain of right knee        No orders of the defined types were placed in this encounter. Medical decision making:  Patient's workup and evaluation were reviewed with the patient in the office today. Imaging reviewed with the patient. Given the patient's history and physical exam I believe the patient will do well with continued conservative treatment.   She does attest that she is receiving less relief with cortisone and therefore we will pre-CERT for viscosupplementation injections. Other wise the patient was educated on conservative treatment as detailed below. All the patient's questions were answered while in the clinic. The patient is understanding of all instructions and agrees with the plan. Treatment Plan:    1. Pre-CERT viscosupplementation injection  2. Activity modification/Rest   3. Ice 20 minutes ever 1-2 hours PRN  4. Take medications as prescribed/instructed  5. Elevation   6. Lightweight exercise/low impact exercise  7. Appropriate diet/weight management      Follow up: PRN    This patient exhibits moderate complexity for obtaining an independent history, reviewing past medical records, independent interpretation/review of diagnostic imaging, and further coordinating care. The patient exhibits low risk given that the patient is being treated with conservative therapy. Approximately 30 minutes were spent reviewing past medical records, imaging, educating the patient, and coordinating care. Deatra Schwab, PA-C    Physician Assistant - Certified    69/36/31 2:12 PM    During this examination, Jonnie PITT [place], [place], functioned as a scribe for Dr. Anthony Lowry. This dictation was performed with a verbal recognition program (DRAGON) and it was checked for errors. It is possible that there are still dictated errors within this office note. If so, please bring any errors to my attention for an addendum. All efforts were made to ensure that this office note is accurate. This dictation was performed with a verbal recognition program (DRAGON) and it was checked for errors. It is possible that there are still dictated errors within this office note. If so, please bring any errors to my attention for an addendum. All efforts were made to ensure that this office note is accurate.     Supervising Physician Attestation:  I, Dr. Anthony Lowry, personally performed the services described in this documentation as

## 2022-06-13 ENCOUNTER — TELEPHONE (OUTPATIENT)
Dept: ORTHOPEDIC SURGERY | Age: 57
End: 2022-06-13

## 2022-06-13 NOTE — TELEPHONE ENCOUNTER
General Question     Subject: GEL INJECTION APPROVAL  [facility]  Contact Number: [phone]    PATIENT CALLING TO FOLLOW UP IF HER GEL INJECTIONS HAS BEEN APPROVE. PLEASE CALL PATIENT AT THE ABOVE NUMBER.

## 2022-07-05 ENCOUNTER — TELEPHONE (OUTPATIENT)
Dept: ORTHOPEDIC SURGERY | Age: 57
End: 2022-07-05

## 2022-07-11 ENCOUNTER — TELEPHONE (OUTPATIENT)
Dept: ORTHOPEDIC SURGERY | Age: 57
End: 2022-07-11

## 2022-07-11 NOTE — TELEPHONE ENCOUNTER
Called patient again and left message that we can't get approval for the synvisc injection because another precert had been done and if she wants us to do the injection she will need to contact previous phy to discontinue it.

## 2022-08-26 ENCOUNTER — HOSPITAL ENCOUNTER (OUTPATIENT)
Dept: WOMENS IMAGING | Age: 57
Discharge: HOME | End: 2022-08-26
Payer: COMMERCIAL

## 2022-08-26 DIAGNOSIS — Z12.31 ENCOUNTER FOR SCREENING MAMMOGRAM FOR BREAST CANCER: ICD-10-CM

## 2022-08-26 PROCEDURE — 77063 BREAST TOMOSYNTHESIS BI: CPT

## 2022-09-01 ENCOUNTER — OFFICE VISIT (OUTPATIENT)
Dept: ORTHOPEDIC SURGERY | Age: 57
End: 2022-09-01
Payer: COMMERCIAL

## 2022-09-01 VITALS — BODY MASS INDEX: 47.09 KG/M2 | WEIGHT: 293 LBS | HEIGHT: 66 IN

## 2022-09-01 DIAGNOSIS — E66.01 CLASS 3 SEVERE OBESITY DUE TO EXCESS CALORIES WITH BODY MASS INDEX (BMI) OF 50.0 TO 59.9 IN ADULT, UNSPECIFIED WHETHER SERIOUS COMORBIDITY PRESENT (HCC): ICD-10-CM

## 2022-09-01 DIAGNOSIS — M17.11 PRIMARY OSTEOARTHRITIS OF RIGHT KNEE: Primary | ICD-10-CM

## 2022-09-01 DIAGNOSIS — M17.12 PRIMARY OSTEOARTHRITIS OF LEFT KNEE: ICD-10-CM

## 2022-09-01 DIAGNOSIS — G89.29 CHRONIC PAIN OF RIGHT KNEE: ICD-10-CM

## 2022-09-01 DIAGNOSIS — M25.561 CHRONIC PAIN OF RIGHT KNEE: ICD-10-CM

## 2022-09-01 DIAGNOSIS — M25.562 CHRONIC PAIN OF LEFT KNEE: ICD-10-CM

## 2022-09-01 DIAGNOSIS — G89.29 CHRONIC PAIN OF LEFT KNEE: ICD-10-CM

## 2022-09-01 PROCEDURE — 20610 DRAIN/INJ JOINT/BURSA W/O US: CPT | Performed by: PHYSICIAN ASSISTANT

## 2022-09-06 NOTE — PROGRESS NOTES
12 [facility]  History and Physical      Date:  2022    Name:  Gadiel Prescott  Address:  [address] [place] [address] [address] [id number]    :  1965      Age:   59 y.o. Chief Complaint    Knee Pain (F/U right knee pain 7/10)      History of Present Illness:  Gadiel Prescott is a 59 y.o. female who presents for follow-up evaluation of her bilateral knee pain, right worse than left. She currently rates it at 8/10 achy, throbbing and intermittently sharp. Pain is worse with prolonged activity. There is intermittent sensations of giving way and instability. She denies any other mechanical symptoms. She is a walking tolerance approximately 20 minutes. She ambulates with a cane. The patient denies any new injury. Conservative treatment has been utilized including: rest, ice, elevation, bracing, physical therapy, home exercises, activity modification, NSAIDs as needed, corticosteroid injections. The patient denies any  joint locking, numbness, paresthesias, or weakness. History from the patient's last visit on 2022. Gadiel Prescott is a 59 y.o. female who presents for follow-up evaluation of her right knee pain. The patient states that her pain is gotten worse since her last visit. She currently rates it at 6/10 achy, throbbing and intermittently sharp. It is worse with bending and walking. She has attempted conservative treatment including: rest, ice, elevation, bracing, physical therapy, home exercises, activity modification, NSAIDs as needed, corticosteroid injections. The patient does get some relief from cortisone but she notes that she has been exhibiting less relief over time. The patient denies any new injury. The patient denies any catching, giving way, joint locking, numbness, paresthesias, or weakness.           Past Medical History:   Diagnosis Date    Cancer ([facility] [address]     thyroid    Hypertension     Thyroid disease     S/P thyroidectomy due to cancer        Past Surgical History:   Procedure Laterality Date    CYSTOSCOPY Right 2020    CYSTOSCOPY, RIGHT STENT PLACEMENT performed by John Bae MD at [address] [place] [address]  10/8/2013    hysteroscopy  endometrial  ablation    KNEE ARTHROSCOPY Left     THYROIDECTOMY, COMPLETION      TONSILLECTOMY         Family History   Problem Relation Age of Onset    Heart Disease Father 48        MI       Social History     Socioeconomic History    Marital status:      Spouse name: None    Number of children: None    Years of education: None    Highest education level: None   Tobacco Use    Smoking status: Former     Types: Cigarettes     Quit date: 10/7/1995     Years since quittin.9    Smokeless tobacco: Never   Substance and Sexual Activity    Alcohol use: No    Drug use: No       Current Outpatient Medications   Medication Sig Dispense Refill    fluticasone-salmeterol (ADVAIR HFA) 115-21 MCG/ACT inhaler Inhale 2 puffs into the lungs every 12 hours      ADVAIR -21 MCG/ACT inhaler INHALE 2 PUFFS BY MOUTH EVERY 12 HOURS      levothyroxine (SYNTHROID) 150 MCG tablet TAKE 1 TABLET BY MOUTH ONCE DAILY      Calcium Carbonate-Vitamin D 500-400 MG-UNIT TABS Take 600 mg by mouth 2 times daily      albuterol sulfate  (90 Base) MCG/ACT inhaler Inhale 2 puffs into the lungs every 4 hours as needed      lisinopril-hydrochlorothiazide (PRINZIDE;ZESTORETIC) 10-12.5 MG per tablet Take by mouth      calcitRIOL (ROCALTROL) 0.5 MCG capsule Take 0.5 mcg by mouth daily. No current facility-administered medications for this visit. Allergies   Allergen Reactions    Penicillins Hives         Review of Systems:  A 14 point review of systems was completed by the patient and is available in the media section of the scanned medical record and was reviewed.         Vital Signs:   Ht 5' 6\" (1.676 m)   Wt (!) 350 lb (158.8 kg)   LMP 2016   BMI 56.49 kg/m² General Exam:    General: Gadiel Prescott is a well-nourished 59y.o. year old female who is sitting comfortably in our office in no acute distress. Neuro: Alert & Oriented x 3,  normal,  no focal deficits noted. Normal mood & affect. Eyes: Sclera clear  Ears: Normal external ear  Mouth: Patient wearing a mask  Pulm: Respirations unlabored and regular   Skin: Warm, well perfused      Knee Examination: Both    Inspection: Quadricep atrophy. No effusion, ecchymosis, discoloration, erythema, excessive warmth. no gross deformities, no signs of infection. Palpation: There is patellofemoral crepitus, there is medial and lateral joint line tenderness. No other osseous or soft tissue tenderness around the knee. Negative calf tenderness    Range of Motion:  0-120 degrees. Appropriate patellar mobility. Strength: Grossly intact    Special Tests: No ligament instability, Negative Homans sign    Gait: antalgic, with utilization of a cane    Alignment: Varus deformity    Neuro: Sensation equal bilateral lower extremities    Vascular: 1+ posterior tibialis pulse          Radiology:   Previously obtain imaging reviewed. No new images obtained. Office Procedures:  After informed consent was provided, the patient was positioned supine on the exam table with the right knee extended to 0 degrees. The anterolateral aspect of the knee adjacent to the joint line was prepped with Chlora-prep. The skin and subcutaneous tissues were anesthetized with ethyl chloride spray. A 22-gauge needle was inserted into the right knee and 1 ml of 10 mg/ml Kenalog was injected. The needle was withdrawn and the puncture site sealed with a Band-Aid. The patient tolerated the procedure well. Post injection instructions and precautions given and any problems to notify us. After informed consent was provided, the patient was positioned supine on the exam table with the left knee extended to 0 degrees.  The anterolateral aspect of the knee adjacent to the joint line was prepped with Chlora-prep. The skin and subcutaneous tissues were anesthetized with ethyl chloride spray. A 22-gauge needle was inserted into the left knee and 1 ml of 10 mg/ml Kenalog was injected. The needle was withdrawn and the puncture site sealed with a Band-Aid. The patient tolerated the procedure well. Post injection instructions and precautions given and any problems to notify us. Assessment: This patient is a 59 y.o. female presenting to the office for follow-up evaluation of her bilateral knee pain. This patient is being treated conservatively for the arthritis in both of her knees. Encounter Diagnoses   Name Primary? Primary osteoarthritis of right knee Yes    Primary osteoarthritis of left knee     Chronic pain of right knee     Chronic pain of left knee     Class 3 severe obesity due to excess calories with body mass index (BMI) of 50.0 to 59.9 in adult, unspecified whether serious comorbidity present ([facility] [address]        No orders of the defined types were placed in this encounter. Medical decision making:  Patient's workup and evaluation were reviewed with the patient in the office today. Imaging was reviewed with the patient. Conservative treatments thoroughly discussed with this patient. Risks and benefits of both conservative treatment and undergoing a total knee replacement were discussed. Eventually she will need a total knee replacement but she would like to put this off as long as possible. All the patient's questions were answered while in the clinic. The patient is understanding of all instructions and agrees with the plan.       Treatment Plan:    Observe postinjection precautions  Pre-CERT viscosupplementation injections  Activity modification/Rest   Ice 20 minutes ever 1-2 hours PRN  Take medications as prescribed/instructed  Elevation   Lightweight exercise/low impact exercise  Appropriate diet/weight management      Follow

## 2022-10-04 ENCOUNTER — OFFICE VISIT (OUTPATIENT)
Dept: ORTHOPEDIC SURGERY | Age: 57
End: 2022-10-04
Payer: COMMERCIAL

## 2022-10-04 VITALS — HEIGHT: 66 IN | BODY MASS INDEX: 47.09 KG/M2 | WEIGHT: 293 LBS

## 2022-10-04 DIAGNOSIS — G89.29 CHRONIC PAIN OF RIGHT KNEE: ICD-10-CM

## 2022-10-04 DIAGNOSIS — G89.29 CHRONIC PAIN OF LEFT KNEE: ICD-10-CM

## 2022-10-04 DIAGNOSIS — M17.12 PRIMARY OSTEOARTHRITIS OF LEFT KNEE: ICD-10-CM

## 2022-10-04 DIAGNOSIS — M25.561 CHRONIC PAIN OF RIGHT KNEE: ICD-10-CM

## 2022-10-04 DIAGNOSIS — M17.11 PRIMARY OSTEOARTHRITIS OF RIGHT KNEE: Primary | ICD-10-CM

## 2022-10-04 DIAGNOSIS — M25.562 CHRONIC PAIN OF LEFT KNEE: ICD-10-CM

## 2022-10-04 PROCEDURE — 20610 DRAIN/INJ JOINT/BURSA W/O US: CPT | Performed by: PHYSICIAN ASSISTANT

## 2022-10-04 PROCEDURE — 99999 PR OFFICE/OUTPT VISIT,PROCEDURE ONLY: CPT | Performed by: PHYSICIAN ASSISTANT

## 2022-10-04 RX ORDER — ROPIVACAINE HYDROCHLORIDE 5 MG/ML
3 INJECTION, SOLUTION EPIDURAL; INFILTRATION; PERINEURAL ONCE
Status: COMPLETED | OUTPATIENT
Start: 2022-10-04 | End: 2022-10-05

## 2022-10-05 RX ADMIN — ROPIVACAINE HYDROCHLORIDE 3 ML: 5 INJECTION, SOLUTION EPIDURAL; INFILTRATION; PERINEURAL at 10:52

## 2022-10-05 NOTE — PROGRESS NOTES
Chief Complaint    Knee Pain (Tyrone knee synvisc inj)      History of Present Illness:  Elsa Carlos is a 59 y.o. female who presents for follow-up evaluation of her bilateral knee pain. This patient is being treated conservatively for the arthritis in both of her knees. This conservative treatment includes: rest, ice, elevation, bracing, home exercises, activity modification, NSAIDs as needed, corticosteroid injections and visco supplementation injections. The patient denies any new injury. The patient denies any numbness, paresthesias, or weakness. History from the patient's last visit on 10/05/22. Elsa Carlos is a 59 y.o. female who presents for follow-up evaluation of her bilateral knee pain, right worse than left. She currently rates it at 8/10 achy, throbbing and intermittently sharp. Pain is worse with prolonged activity. There is intermittent sensations of giving way and instability. She denies any other mechanical symptoms. She is a walking tolerance approximately 20 minutes. She ambulates with a cane. The patient denies any new injury. Conservative treatment has been utilized including: rest, ice, elevation, bracing, physical therapy, home exercises, activity modification, NSAIDs as needed, corticosteroid injections. The patient denies any  joint locking, numbness, paresthesias, or weakness. Pain Assessment  Location of Pain: Knee  Location Modifiers: Left, Right  Quality of Pain: Dull, Sharp, Aching  Duration of Pain: A few minutes  Frequency of Pain: Intermittent  Limiting Behavior: Some  Result of Injury: No  Work-Related Injury: No  Are there other pain locations you wish to document?: No      Physical exam:  Inspection: Both knees without erythema, ecchymosis, discoloration, abrasion, contusions, deformity or signs of infection. Palpation: There is tenderness to palpation to the joint line of both knees. There is patellofemoral crepitus palpable in both knees.   No tenderness to palpation to any other osseous or soft tissue structures of the knee. Range of motion: Grossly intact  Neurovascular: Patient is neurovascularly intact, equally bilaterally. 2+ dorsal pedal pulses. Procedures:    VISCO SUPPLEMENTATION  INJECTION:  Right KNEE    PROCEDURE: Risks, benefits, and alternatives to the injections were discussed in detail with the patient. The risks discussed included but are not limited to infection, skin reactions, hot swollen joints, and anaphylaxis. After informed consent was provided, the patient sat on the exam table. The anterior lateral aspect of the right knee was prepped with Chlora-prep. The skin and subcutaneous tissues were anesthetized with ethyl chloride spray and ropivacaine injected with a 20-gauge needle. The needle was left in place and the syringe was detached from the needle. The Synvisc One syringe was attached to the 20-gauge needle and 48 mg of the Synvisc One was injected into the knee without resistance. The needle was withdrawn and the puncture site sealed with a Band-Aid. The patient tolerated the procedure well. Patient was educated on postinjection precautions. (Conducted by Ilene MICHAEL)      VISCO SUPPLEMENTATION  INJECTION:  Left KNEE    PROCEDURE: Risks, benefits, and alternatives to the injections were discussed in detail with the patient. The risks discussed included but are not limited to infection, skin reactions, hot swollen joints, and anaphylaxis. After informed consent was provided, the patient sat on the exam table. The anterior lateral aspect of the left knee was prepped with Chlora-prep. The skin and subcutaneous tissues were anesthetized with ethyl chloride spray and ropivacaine injected with a 20-gauge needle. The needle was left in place and the syringe was detached from the needle.   The Synvisc One syringe was attached to the 20-gauge needle and 48 mg of the Synvisc One was injected into the knee without resistance. The needle was withdrawn and the puncture site sealed with a Band-Aid. The patient tolerated the procedure well. Patient was educated on postinjection precautions. (Conducted by Tray Escobar PA-C)        Assessment: Sravanthi Allen is a 59 y.o. female who presents for follow-up evaluation of her bilateral knee pain. This patient is being treated conservatively for the arthritis in both of her knees. Encounter Diagnoses   Name Primary? Primary osteoarthritis of right knee Yes    Primary osteoarthritis of left knee     Chronic pain of right knee     Chronic pain of left knee          Treatment plan:  Observe postinjection precautions  Rest   Ice 20 minutes ever 1-2 hours PRN  Utilize medications as prescribed  Activity modification  Lightweight exercise/low impact exercise  Appropriate diet/weight loss                Tray Escobar PA-C  10/05/22 1:27 PM  Physician Assistant - Certified         This dictation was performed with a verbal recognition program (DRAGON) and it was checked for errors. It is possible that there are still dictated errors within this office note. If so, please bring any errors to my attention for an addendum. All efforts were made to ensure that this office note is accurate.

## 2023-02-07 SDOH — HEALTH STABILITY: PHYSICAL HEALTH: ON AVERAGE, HOW MANY MINUTES DO YOU ENGAGE IN EXERCISE AT THIS LEVEL?: 20 MIN

## 2023-02-07 SDOH — HEALTH STABILITY: PHYSICAL HEALTH: ON AVERAGE, HOW MANY DAYS PER WEEK DO YOU ENGAGE IN MODERATE TO STRENUOUS EXERCISE (LIKE A BRISK WALK)?: 2 DAYS

## 2023-02-09 ENCOUNTER — OFFICE VISIT (OUTPATIENT)
Dept: ORTHOPEDIC SURGERY | Age: 58
End: 2023-02-09

## 2023-02-09 VITALS — HEIGHT: 66 IN | WEIGHT: 293 LBS | BODY MASS INDEX: 47.09 KG/M2

## 2023-02-09 DIAGNOSIS — G89.29 CHRONIC PAIN OF LEFT KNEE: ICD-10-CM

## 2023-02-09 DIAGNOSIS — M17.12 PRIMARY OSTEOARTHRITIS OF LEFT KNEE: ICD-10-CM

## 2023-02-09 DIAGNOSIS — M25.562 CHRONIC PAIN OF LEFT KNEE: ICD-10-CM

## 2023-02-09 DIAGNOSIS — M17.11 PRIMARY OSTEOARTHRITIS OF RIGHT KNEE: Primary | ICD-10-CM

## 2023-02-09 DIAGNOSIS — M25.561 CHRONIC PAIN OF RIGHT KNEE: ICD-10-CM

## 2023-02-09 DIAGNOSIS — G89.29 CHRONIC PAIN OF RIGHT KNEE: ICD-10-CM

## 2023-02-09 RX ORDER — METHYLPREDNISOLONE ACETATE 40 MG/ML
40 INJECTION, SUSPENSION INTRA-ARTICULAR; INTRALESIONAL; INTRAMUSCULAR; SOFT TISSUE ONCE
Status: SHIPPED | OUTPATIENT
Start: 2023-02-09

## 2023-02-10 NOTE — PROGRESS NOTES
Chief Complaint    Knee Pain (Tyrone knee pain R>L)      History of Present Illness:  Pal Cervantes is a 59 y.o. female who presents for follow-up evaluation on her bilateral knee pain. This patient is being treated conservatively for the arthritis in both of her knees. This conservative treatment includes: rest, ice, elevation, bracing, home exercises, activity modification, NSAIDs as needed, corticosteroid injections and visco supplementation injections. The patient notes several months of relief with viscosupplementation injections. She feels there by a lateral osteoarthritic knee pain has insidiously returned over the past several weeks. She describes as dull and achy and 4/10 in intensity. She ambulates with a cane. The patient denies any new injury. The patient denies any numbness, paresthesias, or weakness. Physical exam:  Inspection: Both knees without erythema, ecchymosis, discoloration, abrasion, contusions, deformity or signs of infection. Palpation: There is tenderness to palpation to the joint line of both knees. There is patellofemoral crepitus palpable in both knees. No tenderness to palpation to any other osseous or soft tissue structures of the knee. Range of motion: Grossly intact  Neurovascular: Patient is neurovascularly intact, equally bilaterally. 2+ dorsal pedal pulses. Procedures:  After informed consent was provided, the patient was seated on the exam table with the right knee flexed to 90 degrees. The anterolateral aspect of the knee adjacent to the joint line was prepped with Chlora-prep. The skin and subcutaneous tissues were anesthetized with ethyl chloride spray. A 22-gauge needle was inserted into the right knee and 1 ml of 40 mg/ml DepoMedrol was injected. The needle was withdrawn and the puncture site sealed with a Band-Aid. The patient tolerated the procedure well. Post injection instructions and precautions given and any problems to notify us.   (Conducted by Carin Tijerina PA-C)       After informed consent was provided, the patient was seated on the exam table with the left knee flexed to 90 degrees. The anterolateral aspect of the knee adjacent to the joint line was prepped with Chlora-prep. The skin and subcutaneous tissues were anesthetized with ethyl chloride spray. A 22-gauge needle was inserted into the left knee and 1 ml of 40 mg/ml DepoMedrol was injected. The needle was withdrawn and the puncture site sealed with a Band-Aid. The patient tolerated the procedure well. Post injection instructions and precautions  given and any problems to notify us. (Conducted by Carin Tijerina PA-C)      Assessment:  Encounter Diagnoses   Name Primary? Primary osteoarthritis of right knee Yes    Primary osteoarthritis of left knee     Chronic pain of right knee     Chronic pain of left knee          Treatment plan:  Observe postinjection precautions  Rest   Ice 20 minutes ever 1-2 hours PRN  Utilize medications as prescribed  Activity modification  Lightweight exercise/low impact exercise  Appropriate diet/weight loss                  Carin Tijerina PA-C  02/10/23 5:45 PM  Physician Assistant - Certified      This dictation was performed with a verbal recognition program (DRAGON) and it was checked for errors. It is possible that there are still dictated errors within this office note. If so, please bring any errors to my attention for an addendum. All efforts were made to ensure that this office note is accurate.

## (undated) DEVICE — BAG DRNGE 2000ML ROUNDED TEARDROP W/ ANTIREFLX CHMBR DISP

## (undated) DEVICE — BAG DRNGE COMB PK

## (undated) DEVICE — CYSTO: Brand: MEDLINE INDUSTRIES, INC.

## (undated) DEVICE — STERILE POLYISOPRENE POWDER-FREE SURGICAL GLOVES: Brand: PROTEXIS

## (undated) DEVICE — CATHETER URETH 16FR 5CC BLLN SIL ELASTMR F 2 W

## (undated) DEVICE — SOLUTION IRRIG 2000ML STRL H2O UROMATIC PLAS CONT USP